# Patient Record
Sex: FEMALE | Race: OTHER | URBAN - METROPOLITAN AREA
[De-identification: names, ages, dates, MRNs, and addresses within clinical notes are randomized per-mention and may not be internally consistent; named-entity substitution may affect disease eponyms.]

---

## 2017-01-13 ENCOUNTER — APPOINTMENT (OUTPATIENT)
Dept: OCCUPATIONAL MEDICINE | Age: 37
End: 2017-01-13
Attending: EMERGENCY MEDICINE

## 2021-12-02 PROBLEM — I10 BENIGN ESSENTIAL HTN: Status: ACTIVE | Noted: 2021-12-02

## 2021-12-02 PROBLEM — G47.33 OSA (OBSTRUCTIVE SLEEP APNEA): Status: ACTIVE | Noted: 2021-12-02

## 2022-02-22 ENCOUNTER — ORDER TRANSCRIPTION (OUTPATIENT)
Dept: SLEEP CENTER | Age: 42
End: 2022-02-22

## 2022-02-22 ENCOUNTER — OFFICE VISIT (OUTPATIENT)
Dept: SLEEP CENTER | Age: 42
End: 2022-02-22
Attending: INTERNAL MEDICINE
Payer: MEDICAID

## 2022-02-22 DIAGNOSIS — R06.83 SNORING: ICD-10-CM

## 2022-02-22 PROCEDURE — 95806 SLEEP STUDY UNATT&RESP EFFT: CPT

## 2022-02-28 ENCOUNTER — OFFICE VISIT (OUTPATIENT)
Dept: SLEEP CENTER | Age: 42
End: 2022-02-28
Attending: INTERNAL MEDICINE
Payer: MEDICAID

## 2022-02-28 PROCEDURE — 95806 SLEEP STUDY UNATT&RESP EFFT: CPT

## 2022-03-01 NOTE — PROCEDURES
[address] [address],[facility]       Accredited by the [employer] (AASM)    PATIENT'S NAME:        Briana Siegel PHYSICIAN:   Radha Wilkerson M.D. REFERRING PHYSICIAN:   Blayne Denson MD  PATIENT ACCOUNT #:     [de-identified]        LOCATION:       [address] [place] [address] #:      [id number]        YOB: 1980  DATE OF STUDY:         02/28/2022    SLEEP STUDY REPORT    STUDY TYPE:  Unattended sleep study. CLINICAL HISTORY:  The patient is a 42-year-old with a history of snoring and daytime tiredness. UNATTENDED SLEEP STUDY RECORDING PARAMETERS:  The patient underwent a formal technically adequate unattended diagnostic sleep study coordinated with the [facility] [address] [facility]. The study was performed in accordance with the AASM standard for Out of Center Sleep Testing. The four-channel Type III HST measures the following parameters:  flow, respiratory effort, pulse, and oxygen saturation. SCORING:  This study was scored in accordance with AASM scoring rules and Medicare rule 1B. POLYSOMNOGRAPHIC FINDINGS:  The total recording time is 5 hours 59 minutes. The total flow time 5 hours 47 minutes. Total oxygen saturation evaluation time 5 hours. Overall, the sleep study quality appeared to be good. RESPIRATORY MEASURES:  The apnea-hypopnea index is 2.8. The oxygen maria teresa is 40% (this appeared to be artifactual). The patient spent 3% of the record with saturations below 88%. IMPRESSION:  There is no evidence for significant sleep-disordered breathing. RECOMMENDATIONS:    1. Clinical correlation is recommended. The clinical correlation will be deferred to the ordering physician, Dr. Gabino Gordon. If there is still concern for sleep-disordered breathing, an in-laboratory study would be recommended. 2.   The patient should avoid driving or operating heavy machinery while sleepy.     Dictated By Janak Healy M.D.  d: 03/01/2022 14:22:42  t: 03/01/2022 14:43:25  [facility] [id number]  YL/    cc: Tierney Bourne MD

## 2022-07-30 NOTE — ED QUICK NOTES
Pt is pacing the floor, partner in the room with pt. Pt is anxious. Able to redirect with multiple commands.

## 2022-07-30 NOTE — ED NOTES
This writer completed [place] Screening with Pt to assess safety risk. Pt denied SI/HI, no plan or intent. Pt states that her main issue is that she \"drinks too much\". Writer also spoke with Pt's partner, Jamal Lugo, who expressed concern for Pt's passive SI and excessive drinking. Jamal Lugo denied that Pt has verbalized or attempted any sort of suicide plan. Writer staffed the case with Dr. Gavino German, who gave the LOC recommendation for Pt to receive referrals for outpatient and residential programs for alcohol use. Writer discussed Tx options with Pt, and Pt reported that she would follow up with referrals for Tx since she is OON with [facility] and does not meet IP criteria and does not want detox.

## 2022-07-30 NOTE — ED QUICK NOTES
Pt c/o neck cramp/ inability to move her neck. MD aware and at bedside for assessment. Orders received for benadryl in case of side effects to medication, see MAR.

## 2022-07-30 NOTE — ED NOTES
As writer discussed disposition with pt, pt became increasingly irritable- as evidenced by, increased volume in voice, verbal aggression, swearing, name calling and threats. Pt continued to be verbally aggressive with EMTs, RNs, and [employer].

## 2022-07-30 NOTE — ED QUICK NOTES
Pt continues to pace around room and cry to staff. Pts family member out to nurses station stating pt spit out the ativan given prior. Spoke with Dr. Silvia Dejesus who stated to give pt haldol IV. Pt placed on monitor for close assessment.

## 2022-07-30 NOTE — ED QUICK NOTES
Security at bedside, pt in hallway refusing to go back to room. Pt medicated, see MAR. Pt getting more verbally aggressive with staff.

## 2022-07-30 NOTE — ED QUICK NOTES
Pt is becoming combative with staff, she is not redirectable, security was called, pt is pacing the floor, and stating that she is getting her belongings and going home.

## 2022-07-30 NOTE — ED INITIAL ASSESSMENT (HPI)
Pt arrives via EMS from [facility]. Per [facility] paperwork pt was taken for alcohol use disorder. Pt currently denies drug/alcohol use. Pt arrives with Petition from [facility] and needs med clearance. Pt states \"im just a little emotional today, I dont want to be here, I want to be at home sleeping, my girlfriend brought me here and shes taking all my money and shes going to go [place]. \" Pt denies SI/HI and any drug/ alcohol use. \" Pt verbally aggressive on arrival.

## 2023-12-12 ENCOUNTER — OFFICE VISIT (OUTPATIENT)
Dept: FAMILY MEDICINE CLINIC | Facility: CLINIC | Age: 43
End: 2023-12-12
Payer: MEDICAID

## 2023-12-12 VITALS
DIASTOLIC BLOOD PRESSURE: 82 MMHG | HEIGHT: 69 IN | HEART RATE: 94 BPM | BODY MASS INDEX: 33.03 KG/M2 | SYSTOLIC BLOOD PRESSURE: 122 MMHG | TEMPERATURE: 98 F | WEIGHT: 223 LBS | RESPIRATION RATE: 18 BRPM

## 2023-12-12 DIAGNOSIS — R06.81 APNEA: ICD-10-CM

## 2023-12-12 DIAGNOSIS — Z12.31 ENCOUNTER FOR SCREENING MAMMOGRAM FOR MALIGNANT NEOPLASM OF BREAST: ICD-10-CM

## 2023-12-12 DIAGNOSIS — Z12.4 SCREENING FOR MALIGNANT NEOPLASM OF CERVIX: ICD-10-CM

## 2023-12-12 DIAGNOSIS — F41.9 ANXIETY: ICD-10-CM

## 2023-12-12 DIAGNOSIS — I10 BENIGN ESSENTIAL HTN: Primary | ICD-10-CM

## 2023-12-12 DIAGNOSIS — Z00.00 LABORATORY EXAMINATION ORDERED AS PART OF A ROUTINE GENERAL MEDICAL EXAMINATION: ICD-10-CM

## 2023-12-12 DIAGNOSIS — R06.83 SNORES: ICD-10-CM

## 2023-12-12 PROCEDURE — 99205 OFFICE O/P NEW HI 60 MIN: CPT | Performed by: FAMILY MEDICINE

## 2023-12-12 PROCEDURE — 3074F SYST BP LT 130 MM HG: CPT | Performed by: FAMILY MEDICINE

## 2023-12-12 PROCEDURE — 3008F BODY MASS INDEX DOCD: CPT | Performed by: FAMILY MEDICINE

## 2023-12-12 PROCEDURE — 3079F DIAST BP 80-89 MM HG: CPT | Performed by: FAMILY MEDICINE

## 2023-12-12 RX ORDER — LISINOPRIL AND HYDROCHLOROTHIAZIDE 25; 20 MG/1; MG/1
1 TABLET ORAL DAILY
Qty: 90 TABLET | Refills: 0 | Status: SHIPPED | OUTPATIENT
Start: 2023-12-12

## 2023-12-12 RX ORDER — PHENTERMINE HYDROCHLORIDE 37.5 MG/1
37.5 TABLET ORAL
Qty: 90 TABLET | Refills: 0 | Status: CANCELLED | OUTPATIENT
Start: 2023-12-12

## 2024-02-01 ENCOUNTER — OFFICE VISIT (OUTPATIENT)
Dept: OTHER | Facility: HOSPITAL | Age: 44
End: 2024-02-01
Attending: PREVENTIVE MEDICINE

## 2024-02-01 DIAGNOSIS — Z02.1 PRE-EMPLOYMENT HEALTH SCREENING EXAMINATION: Primary | ICD-10-CM

## 2024-02-01 PROCEDURE — 86480 TB TEST CELL IMMUN MEASURE: CPT

## 2024-02-05 LAB
M TB IFN-G CD4+ T-CELLS BLD-ACNC: 0.03 IU/ML
M TB TUBERC IFN-G BLD QL: NEGATIVE
M TB TUBERC IGNF/MITOGEN IGNF CONTROL: >10 IU/ML
QFT TB1 AG MINUS NIL: 0 IU/ML
QFT TB2 AG MINUS NIL: 0 IU/ML

## 2024-03-01 ENCOUNTER — LAB ENCOUNTER (OUTPATIENT)
Dept: LAB | Age: 44
End: 2024-03-01
Attending: FAMILY MEDICINE
Payer: MEDICAID

## 2024-03-01 DIAGNOSIS — Z00.00 LABORATORY EXAMINATION ORDERED AS PART OF A ROUTINE GENERAL MEDICAL EXAMINATION: ICD-10-CM

## 2024-03-01 LAB
ALBUMIN SERPL-MCNC: 3.7 G/DL (ref 3.4–5)
ALBUMIN/GLOB SERPL: 0.8 {RATIO} (ref 1–2)
ALP LIVER SERPL-CCNC: 83 U/L
ALT SERPL-CCNC: 32 U/L
ANION GAP SERPL CALC-SCNC: 6 MMOL/L (ref 0–18)
AST SERPL-CCNC: 33 U/L (ref 15–37)
BASOPHILS # BLD AUTO: 0.06 X10(3) UL (ref 0–0.2)
BASOPHILS NFR BLD AUTO: 0.5 %
BILIRUB SERPL-MCNC: 0.6 MG/DL (ref 0.1–2)
BUN BLD-MCNC: 13 MG/DL (ref 9–23)
CALCIUM BLD-MCNC: 9.2 MG/DL (ref 8.5–10.1)
CHLORIDE SERPL-SCNC: 100 MMOL/L (ref 98–112)
CHOLEST SERPL-MCNC: 250 MG/DL (ref ?–200)
CO2 SERPL-SCNC: 26 MMOL/L (ref 21–32)
CREAT BLD-MCNC: 0.81 MG/DL
EGFRCR SERPLBLD CKD-EPI 2021: 93 ML/MIN/1.73M2 (ref 60–?)
EOSINOPHIL # BLD AUTO: 0.35 X10(3) UL (ref 0–0.7)
EOSINOPHIL NFR BLD AUTO: 3 %
ERYTHROCYTE [DISTWIDTH] IN BLOOD BY AUTOMATED COUNT: 15.3 %
EST. AVERAGE GLUCOSE BLD GHB EST-MCNC: 120 MG/DL (ref 68–126)
FASTING PATIENT LIPID ANSWER: YES
FASTING STATUS PATIENT QL REPORTED: YES
GLOBULIN PLAS-MCNC: 4.5 G/DL (ref 2.8–4.4)
GLUCOSE BLD-MCNC: 106 MG/DL (ref 70–99)
HBA1C MFR BLD: 5.8 % (ref ?–5.7)
HCT VFR BLD AUTO: 41.5 %
HDLC SERPL-MCNC: 95 MG/DL (ref 40–59)
HGB BLD-MCNC: 13.5 G/DL
IMM GRANULOCYTES # BLD AUTO: 0.04 X10(3) UL (ref 0–1)
IMM GRANULOCYTES NFR BLD: 0.3 %
LDLC SERPL CALC-MCNC: 132 MG/DL (ref ?–100)
LYMPHOCYTES # BLD AUTO: 2.36 X10(3) UL (ref 1–4)
LYMPHOCYTES NFR BLD AUTO: 20.5 %
MCH RBC QN AUTO: 28.3 PG (ref 26–34)
MCHC RBC AUTO-ENTMCNC: 32.5 G/DL (ref 31–37)
MCV RBC AUTO: 87 FL
MONOCYTES # BLD AUTO: 0.71 X10(3) UL (ref 0.1–1)
MONOCYTES NFR BLD AUTO: 6.2 %
NEUTROPHILS # BLD AUTO: 7.99 X10 (3) UL (ref 1.5–7.7)
NEUTROPHILS # BLD AUTO: 7.99 X10(3) UL (ref 1.5–7.7)
NEUTROPHILS NFR BLD AUTO: 69.5 %
NONHDLC SERPL-MCNC: 155 MG/DL (ref ?–130)
OSMOLALITY SERPL CALC.SUM OF ELEC: 275 MOSM/KG (ref 275–295)
PLATELET # BLD AUTO: 496 10(3)UL (ref 150–450)
POTASSIUM SERPL-SCNC: 3.5 MMOL/L (ref 3.5–5.1)
PROT SERPL-MCNC: 8.2 G/DL (ref 6.4–8.2)
RBC # BLD AUTO: 4.77 X10(6)UL
SODIUM SERPL-SCNC: 132 MMOL/L (ref 136–145)
TRIGL SERPL-MCNC: 136 MG/DL (ref 30–149)
TSI SER-ACNC: 2.68 MIU/ML (ref 0.36–3.74)
VLDLC SERPL CALC-MCNC: 25 MG/DL (ref 0–30)
WBC # BLD AUTO: 11.5 X10(3) UL (ref 4–11)

## 2024-03-01 PROCEDURE — 84443 ASSAY THYROID STIM HORMONE: CPT

## 2024-03-01 PROCEDURE — 80061 LIPID PANEL: CPT

## 2024-03-01 PROCEDURE — 80053 COMPREHEN METABOLIC PANEL: CPT

## 2024-03-01 PROCEDURE — 83036 HEMOGLOBIN GLYCOSYLATED A1C: CPT

## 2024-03-01 PROCEDURE — 85025 COMPLETE CBC W/AUTO DIFF WBC: CPT

## 2024-03-10 DIAGNOSIS — I10 BENIGN ESSENTIAL HTN: ICD-10-CM

## 2024-03-11 RX ORDER — LISINOPRIL AND HYDROCHLOROTHIAZIDE 25; 20 MG/1; MG/1
1 TABLET ORAL DAILY
Qty: 90 TABLET | Refills: 0 | Status: SHIPPED | OUTPATIENT
Start: 2024-03-11

## 2024-03-15 ENCOUNTER — OFFICE VISIT (OUTPATIENT)
Dept: FAMILY MEDICINE CLINIC | Facility: CLINIC | Age: 44
End: 2024-03-15
Payer: MEDICAID

## 2024-03-15 VITALS
SYSTOLIC BLOOD PRESSURE: 126 MMHG | TEMPERATURE: 98 F | HEIGHT: 69 IN | RESPIRATION RATE: 18 BRPM | DIASTOLIC BLOOD PRESSURE: 78 MMHG | WEIGHT: 215 LBS | BODY MASS INDEX: 31.84 KG/M2 | HEART RATE: 79 BPM

## 2024-03-15 DIAGNOSIS — R39.15 URINARY URGENCY: Primary | ICD-10-CM

## 2024-03-15 DIAGNOSIS — F31.9 BIPOLAR 1 DISORDER (HCC): ICD-10-CM

## 2024-03-15 DIAGNOSIS — R73.09 ELEVATED GLUCOSE: ICD-10-CM

## 2024-03-15 DIAGNOSIS — L29.9 PRURITUS: ICD-10-CM

## 2024-03-15 DIAGNOSIS — E78.00 HYPERCHOLESTEROLEMIA: ICD-10-CM

## 2024-03-15 LAB
APPEARANCE: CLEAR
BILIRUBIN: NEGATIVE
GLUCOSE (URINE DIPSTICK): NEGATIVE MG/DL
KETONES (URINE DIPSTICK): NEGATIVE MG/DL
MULTISTIX LOT#: ABNORMAL NUMERIC
NITRITE, URINE: NEGATIVE
OCCULT BLOOD: NEGATIVE
PH, URINE: 7 (ref 4.5–8)
PROTEIN (URINE DIPSTICK): NEGATIVE MG/DL
SPECIFIC GRAVITY: 1.02 (ref 1–1.03)
URINE-COLOR: YELLOW
UROBILINOGEN,SEMI-QN: 0.2 MG/DL (ref 0–1.9)

## 2024-03-15 PROCEDURE — 99214 OFFICE O/P EST MOD 30 MIN: CPT | Performed by: FAMILY MEDICINE

## 2024-03-15 PROCEDURE — 81003 URINALYSIS AUTO W/O SCOPE: CPT | Performed by: FAMILY MEDICINE

## 2024-03-15 RX ORDER — LOVASTATIN 40 MG/1
40 TABLET ORAL NIGHTLY
Qty: 90 TABLET | Refills: 0 | Status: SHIPPED | OUTPATIENT
Start: 2024-03-15 | End: 2024-06-13

## 2024-03-15 NOTE — PROGRESS NOTES
[facility] Office Note  Chief Complaint:   Chief Complaint   Patient presents with    Lab Results     Labs completed on 03/01/2024.  Pt sts she is not feeling like herself lately.     Urge Incontinence    Itching       HPI:   This is a 43 year old female coming in for lab review.  The patient states that recently she has had some increased itching of her hands.  Has not had any other itching anywhere else.  She also has had some increased urinary frequency.  She states that she would like to be referred to psychiatry she has been seeing a psychiatrist but his office is too far at this point.  She is currently on Abilify as well as Zoloft.  She states that she feels that she would like to be on something else to help she has had some sexual side effects with these medications as well as weight gain.      Past Medical History:   Diagnosis Date    Arthritis     Essential hypertension     Tobacco abuse      Past Surgical History:   Procedure Laterality Date    LAPAROSCOPIC CHOLECYSTECTOMY       Social History:  Social History     Socioeconomic History    Marital status:    Tobacco Use    Smoking status: Every Day    Smokeless tobacco: Never   Vaping Use    Vaping Use: Never used   Substance and Sexual Activity    Alcohol use: Yes    Drug use: Never     Family History:  Family History   Problem Relation Age of Onset    Heart Disorder Father     Hypertension Father     No Known Problems Mother      Allergies:  No Known Allergies  Current Meds:  Current Outpatient Medications   Medication Sig Dispense Refill    Lovastatin 40 MG Oral Tab Take 1 tablet (40 mg total) by mouth nightly. 90 tablet 0    metFORMIN HCl 1000 MG Oral Tab Take 1 tablet (1,000 mg total) by mouth 2 (two) times daily with meals. 180 tablet 0    lisinopril-hydroCHLOROthiazide 20-25 MG Oral Tab Take 1 tablet by mouth daily. 90 tablet 0    sertraline 50 MG Oral Tab Take 1 tablet (50 mg total) by mouth daily. 90 tablet 0     traZODone 100 MG Oral Tab Take 1 tablet (100 mg total) by mouth daily.      Phentermine HCl 37.5 MG Oral Tab Take 1 tablet (37.5 mg total) by mouth every morning before breakfast. (Patient not taking: Reported on 12/12/2023) 90 tablet 0      Ready to quit: Not Answered  Counseling given: Not Answered       REVIEW OF SYSTEMS:   Consitutional: No fevers, chills, sweats  Eye: No recent visual problems  ENMT: No ear pain nasal congestion sore throat  Respiratory: No shortness of breath, cough  Cardiovascular: No chest pain palpitations syncope  Gastrointestinal: No nausea vomiting diarrhea  Genitourinary: No hematuria positive urinary frequency  Hema/Lymph no bruising tendency, swollen lymph glands  Endocrine: Negative for excessive thirst excessive hunger  Musculoskeletal: No back pain neck pain joint pain muscle pain decreased range of motion  Integumentary: No rash, positive pruritus, no abrasions  Neurologic: Alert and oriented x4  Psychiatric: No anxiety, depression    Medical, surgical, family, and social histories were reviewed      EXAM:   VITALS:   Vitals:    03/15/24 1102   BP: 126/78   Pulse: 79   Resp: 18   Temp: 97.9 °F (36.6 °C)      GENERAL: well developed, well nourished, in no apparent distress  SKIN: no rashes, no suspicious lesions: Cool and Dry  HEENT: atraumatic, normocephalic, ears and throat are clear    Ears: TM's clear and visible bilaterally, no excess cerumen or erythema.   EYES: Pupils equal round and reactive.  Extraocular motions intact no scleral icterus no injection or drainage  THROAT without erythema tonsillar hypertrophy or exudate.  Uvula midline airway patent  NECK: Given midline.  No JVD or lymphadenopathy supple nontender no meningeal signs   LUNGS: clear to auscultation sounds equal bilaterally no wheezes rales or rhonchi  CARDIO: Regular rate and rhythm without murmurs gallops or rubs  PSYCHIATRIC: Awake, alert and oriented x3, cooperative appropriate mood and affect.  Judgment  intact       ASSESSMENT AND PLAN:   1. Elevated glucose  - metFORMIN HCl 1000 MG Oral Tab; Take 1 tablet (1,000 mg total) by mouth 2 (two) times daily with meals.  Dispense: 180 tablet; Refill: 0  - Hemoglobin A1C; Future  I did discuss with the patient that her hemoglobin A1c is currently at a 5.8 I did discuss her like for her to focus on her diet decreasing fatty food fried food intake.  She was asked to try to focus on her protein as well.  Will be starting her on metformin at 1000 mg twice a day.  She was asked to follow-up in the next 3 months to repeat a hemoglobin A1c.  2. Hypercholesterolemia  - Lovastatin 40 MG Oral Tab; Take 1 tablet (40 mg total) by mouth nightly.  Dispense: 90 tablet; Refill: 0  - Comp Metabolic Panel (14); Future  - Lipid Panel; Future  I did discuss with the patient that her LDL did go up from her previous blood work at this time would suggest starting her on lovastatin she was asked to use this once a day she was asked to watch her diet we will be updating blood work in the next 3 months  3. Bipolar 1 disorder (HCC)  - Psychology Referral - In Network  I did discuss with the patient we will go ahead and place a referral to psychiatry as she wants to have her medications adjusted.  4. Pruritus  I did discuss with the patient that this may be the gloves that she has been using she is a CNA she was asked to try the nitrile gloves instead     Meds & Refills for this Visit:  Requested Prescriptions     Signed Prescriptions Disp Refills    Lovastatin 40 MG Oral Tab 90 tablet 0     Sig: Take 1 tablet (40 mg total) by mouth nightly.    metFORMIN HCl 1000 MG Oral Tab 180 tablet 0     Sig: Take 1 tablet (1,000 mg total) by mouth 2 (two) times daily with meals.       Health Maintenance:  Health Maintenance Due   Topic Date Due    Annual Physical  Never done    Mammogram  Never done    Tobacco Cessation Counseling 1 Year  Never done    Pap Smear  Never done    COVID-19 Vaccine (3 - 2023-24  season) 09/01/2023    Influenza Vaccine (1) Never done       Patient/Caregiver Education: Patient/Caregiver Education: There are no barriers to learning. Medical education done.   Outcome: Patient verbalizes understanding. Patient is notified to call with any questions, complications, allergies, or worsening or changing symptoms.  Patient is to call with any side effects or complications from the treatments as a result of today.     Problem List:  Patient Active Problem List   Diagnosis    BMI 30.0-30.9,adult    Benign essential HTN    GARCIA (obstructive sleep apnea)         No follow-ups on file.     Rakesh Ho MD    Please note that portions of this note may have been completed with a voice recognition program. Efforts were made to edit the dictations but occasionally words are mis-transcribed.

## 2024-03-22 ENCOUNTER — TELEPHONE (OUTPATIENT)
Age: 44
End: 2024-03-22

## 2024-03-29 ENCOUNTER — TELEPHONE (OUTPATIENT)
Age: 44
End: 2024-03-29

## 2024-03-29 NOTE — TELEPHONE ENCOUNTER
Psychiatry    [facility]  [address]   [address]  [place] [address]  Phone: [phone]    [facility]  [address]  [address]  [place] [address]  Phone: [phone]    [facility]  [address] [address]  Phone: [phone]    Therapy    Kathy Stewart, LCSW  [facility]  [address]   [address]  [place] [address]  Phone: [phone]    [facility]  [address]  [address]  [place] [address]  Phone: [phone]    [facility]  [address]  [address]  [place] [address]  Phone: [phone]

## 2024-04-16 ENCOUNTER — OFFICE VISIT (OUTPATIENT)
Dept: SLEEP CENTER | Age: 44
End: 2024-04-16
Attending: INTERNAL MEDICINE
Payer: MEDICAID

## 2024-04-16 DIAGNOSIS — R06.81 APNEA: Primary | ICD-10-CM

## 2024-04-16 DIAGNOSIS — G47.10 HYPERSOMNOLENCE: ICD-10-CM

## 2024-04-16 DIAGNOSIS — R06.83 SNORING: ICD-10-CM

## 2024-04-16 DIAGNOSIS — R06.81 WITNESSED EPISODE OF APNEA: ICD-10-CM

## 2024-04-16 PROCEDURE — 95811 POLYSOM 6/>YRS CPAP 4/> PARM: CPT

## 2024-04-25 ENCOUNTER — SLEEP STUDY (OUTPATIENT)
Facility: CLINIC | Age: 44
End: 2024-04-25
Payer: MEDICAID

## 2024-04-25 DIAGNOSIS — G47.33 OBSTRUCTIVE SLEEP APNEA SYNDROME: Primary | ICD-10-CM

## 2024-04-25 PROCEDURE — 95811 POLYSOM 6/>YRS CPAP 4/> PARM: CPT | Performed by: OTHER

## 2024-04-29 ENCOUNTER — HOSPITAL ENCOUNTER (OUTPATIENT)
Dept: MAMMOGRAPHY | Age: 44
Discharge: HOME | End: 2024-04-29
Attending: FAMILY MEDICINE
Payer: MEDICAID

## 2024-04-29 DIAGNOSIS — Z12.31 ENCOUNTER FOR SCREENING MAMMOGRAM FOR MALIGNANT NEOPLASM OF BREAST: ICD-10-CM

## 2024-04-29 PROCEDURE — 77067 SCR MAMMO BI INCL CAD: CPT | Performed by: FAMILY MEDICINE

## 2024-04-29 PROCEDURE — 77063 BREAST TOMOSYNTHESIS BI: CPT | Performed by: FAMILY MEDICINE

## 2024-04-30 ENCOUNTER — OFFICE VISIT (OUTPATIENT)
Facility: CLINIC | Age: 44
End: 2024-04-30
Payer: MEDICAID

## 2024-04-30 VITALS
BODY MASS INDEX: 32.29 KG/M2 | HEIGHT: 69 IN | OXYGEN SATURATION: 98 % | HEART RATE: 82 BPM | TEMPERATURE: 98 F | DIASTOLIC BLOOD PRESSURE: 78 MMHG | WEIGHT: 218 LBS | RESPIRATION RATE: 16 BRPM | SYSTOLIC BLOOD PRESSURE: 130 MMHG

## 2024-04-30 DIAGNOSIS — E66.01 SEVERE OBESITY (BMI >= 40) (HCC): Primary | ICD-10-CM

## 2024-04-30 PROCEDURE — 99204 OFFICE O/P NEW MOD 45 MIN: CPT | Performed by: INTERNAL MEDICINE

## 2024-04-30 NOTE — PROGRESS NOTES
This is a 43 year old female who presents with the following symptoms, risk factors, behaviors or other items associated with sleep problems.    Sleep Apnea:   snoring; gasping/choking; family member with sleep apnea  Insomnia:  difficulty falling asleep  Restless Leg:  urge to move legs when trying to sleep  Parasomnias:   very restless sleep; teeth grinding  Daytime Problems:  fatigue; inattentiveness    The patient's [place] Sleepiness score is 0/24.

## 2024-04-30 NOTE — PATIENT INSTRUCTIONS
Plan:    Initiate  APAP at 8-14cm H2O, with humidity at 3 and EPR on. with a [employer] Vitera mask, size Medium.   Then Obtain Download data for compliance and efficacy from CPAP machine in 30 days   Advised about weight loss   Referral to weight loss clinic   Advised against drowsy driving and to avoid alcoholic beverage and respiratory depressants as these may worsen sleep apnea      Follow up: 3  months     Dwayne Bansal MD      Obstructive Sleep Apnea  Obstructive sleep apnea is a condition caused by air passages becoming narrowed or blocked during sleep. As a result, breathing stops for short periods. Your body wakes up enough for breathing to start again. But you don't remember it. The cycle of stopped breathing and brief awakenings can repeat dozens of times a night. This prevents the body from getting to the deeper stages of sleep that are needed for good rest.   Signs of sleep apnea include loud snoring, noisy breathing, and gasping sounds during sleep. People with sleep apnea often find they use the bathroom many times during the night. Daytime symptoms include waking up tired after a full night's sleep and waking up with headaches. They can also include feeling very sleepy or falling asleep during the day, and having problems with memory or concentration.   Risk factors for sleep apnea include:  Being overweight  Being assigned male at birth, or being in menopause  Smoking  Using alcohol or sedating medicines  Having enlarged structures in the nose or throat such as enlarged tonsils or adenoids, or extra tissue in the airway  Home care  Lifestyle changes that can help treat snoring and sleep apnea include:   If you're overweight, talk with your healthcare provider about a weight-loss plan for you.  Don't drink alcohol for 3 to 4 hours before bedtime.  Don't take sedating medicines. Ask your healthcare provider about the medicines you take.  If you smoke, talk to your provider about ways to quit.  It's important to stay away from secondhand smoke. Don't use e-cigarettes because of their harmful side effects.  Sleep on your side. This can help prevent gravity from pulling relaxed throat tissues into your breathing passages.  If you have allergies or sinus problems that block your nose, ask your provider for help.  Use positive airway pressure (PAP). Discuss with your provider the benefits of using PAP at home. And talk about the type of PAP that's best for you.  Follow-up care  Follow up with your healthcare provider, or as advised. A diagnosis of sleep apnea is made with a sleep study. Your provider can tell you more about this test.   When to get medical care  See your healthcare provider if you have daytime symptoms of sleep apnea. These include:   Waking up tired after a full night's sleep  Waking up with a headache  Feeling very sleepy or falling asleep during the day  Having problems with memory or concentration  Also talk with your provider if your partner tells you that you snore, gasp for air, or stop breathing while you sleep.   Seeing your provider is important because sleep apnea can make you more likely to have certain health problems. These include high blood pressure, heart attack, stroke, and sexual dysfunction. If you have sleep apnea, talk with your healthcare provider about the best treatments for you.   [employer] last reviewed this educational content on 5/1/2022 © 2000-2023 [employer]. All rights reserved. This information is not intended as a substitute for professional medical care. Always follow your healthcare professional's instructions.        Continuous Positive Air Pressure (CPAP)     A mask over the nose gently directs air into the throat to keep the airway open.     Continuous positive air pressure (CPAP) uses gentle air pressure to hold the airway open. CPAP is often the most effective treatment for sleep apnea. It works very well as a treatment for adults  diagnosed with obstructive sleep apnea with a lot of sleepiness. But keep in mind that it can take several adjustments before the setup is right for you.   How CPAP works  The CPAP machine  is a small portable pump that sits beside the bed. The pump sends air through a hose, which is held over your nose alone, or nose and mouth by a mask. Mild air pressure is gently pushed through your airway. The air pressure nudges sagging tissues aside. This widens the airway so you can breathe better. CPAP may be combined with other kinds of therapy for sleep apnea.   Types of air pressure treatments  There are different types of CPAP. Your doctor or CPAP technician will help you decide which type is best for you:   Basic CPAP keeps the pressure constant all night long.  A bilevel device (BiPAP) provides more pressure when you breathe in and less when you breathe out. A BiPAP machine also may be set to provide automatic breaths to maintain breathing if you stop breathing while sleeping.  An autoCPAP device automatically adjusts pressure throughout the night and in response to changes such as body position, sleep stage, and snoring.  [employer] last reviewed this educational content on 7/1/2019  © 0346-2854 [employer]. All rights reserved. This information is not intended as a substitute for professional medical care. Always follow your healthcare professional's instructions.

## 2024-04-30 NOTE — PROGRESS NOTES
Pulmonary/Critical Care/Sleep Medicine    Consult Note     PCP: Rakesh Ho MD   Phone: [phone]   Fax: [phone]          Chief Complaint   Patient presents with    Apnea     Snoring, witnessed apneas.  DX sleep study done.         HPI  I had the pleasure of seeing Jaylin Sanford who is a pleasant 43 year old female who presents for evaluation of GARCIA         The patient states that she has snored at home in past and was noted to have witnessed apnea, she underwent a sleep study that showed GARCIA, so she presents for sleep evaluation.      She states bed time around 10 PM . It takes 30 min to 1 hour to fall asleep and leaves bed around 6  AM. She wakes up sometimes 4  times a night,goes to bathroom.  She is sleepy and fatigued during the daytime.  She admits to tossing and turning at night.  She admits to nightmares,denies  sleep talking or sleep walking.  She admits to occasional  AM headaches.  She denies symptoms sleep attacks     The patient admits to kicking legs at night.  Admits to teeth grinding, and has used dental guard in past .          She drinks 2 cups of caffeine coffee daily,        She has no pets             Hx of tobacco use: She  reports that she has been smoking cigarettes. She started smoking about 10 years ago. She has never used smokeless tobacco.    Past Medical History:    Arthritis    Bipolar I disorder (HCC)    Essential hypertension    HTN (hypertension)    Tobacco abuse    Type II diabetes mellitus (HCC)      Past Surgical History:   Procedure Laterality Date    Laparoscopic cholecystectomy       No Known Allergies  Current Outpatient Medications   Medication Sig Dispense Refill    Lovastatin 40 MG Oral Tab Take 1 tablet (40 mg total) by mouth nightly. 90 tablet 0    metFORMIN HCl 1000 MG Oral Tab Take 1 tablet (1,000 mg total) by mouth 2 (two) times daily with meals. 180 tablet 0    lisinopril-hydroCHLOROthiazide 20-25 MG Oral Tab Take 1 tablet by mouth daily. 90 tablet  0    sertraline 50 MG Oral Tab Take 1 tablet (50 mg total) by mouth daily. 90 tablet 0    traZODone 100 MG Oral Tab Take 1 tablet (100 mg total) by mouth daily.      Phentermine HCl 37.5 MG Oral Tab Take 1 tablet (37.5 mg total) by mouth every morning before breakfast. (Patient not taking: Reported on 12/12/2023) 90 tablet 0      Social History     Socioeconomic History    Marital status:    Occupational History    Occupation: CNA     Comment: and nursing student   Tobacco Use    Smoking status: Every Day     Types: Cigarettes     Start date: 2014    Smokeless tobacco: Never    Tobacco comments:     Smokes few cigarettes per day    Vaping Use    Vaping status: Never Used   Substance and Sexual Activity    Alcohol use: Not Currently    Drug use: Not Currently      Immunization History   Administered Date(s) Administered    Covid-19 Vaccine Moderna 100 mcg/0.5 ml 09/03/2021, 10/09/2021    TDAP 04/28/2019      Family History   Problem Relation Age of Onset    Depression Mother     Heart Disorder Father     Hypertension Father     Breast Cancer Maternal Aunt 60        Review of Systems   Constitutional:  Positive for fatigue. Negative for fever and unexpected weight change.   HENT:  Negative for congestion, mouth sores, nosebleeds, postnasal drip, rhinorrhea, sore throat and trouble swallowing.    Eyes:  Negative for visual disturbance.   Respiratory:  Negative for apnea, cough, choking, chest tightness, shortness of breath and wheezing.    Cardiovascular:  Negative for chest pain, palpitations and leg swelling.   Gastrointestinal:  Negative for abdominal pain, constipation, diarrhea, nausea and vomiting.   Genitourinary:  Negative for difficulty urinating.   Musculoskeletal:  Negative for arthralgias, back pain, gait problem and myalgias.   Neurological:  Positive for headaches. Negative for dizziness and weakness.   Psychiatric/Behavioral:  Positive for sleep disturbance.         Vitals:    04/30/24 1332   BP:  130/78   Pulse: 82   Resp: 16   Temp: 98 °F (36.7 °C)      SpO2: 98 %  Ht Readings from Last 1 Encounters:   04/30/24 5' 9\" (1.753 m)     Wt Readings from Last 1 Encounters:   04/30/24 218 lb (98.9 kg)     Body mass index is 32.19 kg/m².     Physical Exam  Constitutional:       General: She is not in acute distress.     Appearance: Normal appearance. She is not ill-appearing or diaphoretic.   HENT:      Head: Normocephalic and atraumatic.      Nose: Nose normal. No congestion or rhinorrhea.      Comments: Narrow nares      Mouth/Throat:      Mouth: Mucous membranes are moist.      Pharynx: Oropharynx is clear. No oropharyngeal exudate or posterior oropharyngeal erythema.      Comments: Mallampati class IV palate   Eyes:      Extraocular Movements: Extraocular movements intact.      Pupils: Pupils are equal, round, and reactive to light.   Cardiovascular:      Rate and Rhythm: Normal rate.      Pulses: Normal pulses.      Heart sounds: Normal heart sounds. No murmur heard.  Pulmonary:      Effort: Pulmonary effort is normal. No respiratory distress.      Breath sounds: Normal breath sounds. No wheezing or rhonchi.   Chest:      Chest wall: No tenderness.   Abdominal:      General: Abdomen is flat. Bowel sounds are normal.      Palpations: Abdomen is soft.   Musculoskeletal:         General: Normal range of motion.   Skin:     General: Skin is warm.   Neurological:      General: No focal deficit present.      Mental Status: She is alert and oriented to person, place, and time.   Psychiatric:         Mood and Affect: Mood normal.         Behavior: Behavior normal.         Thought Content: Thought content normal.         Judgment: Judgment normal.             Labs:  Last BMP  Lab Results   Component Value Date     (H) 03/01/2024    BUN 13 03/01/2024    CREATSERUM 0.81 03/01/2024    ANIONGAP 6 03/01/2024    GFRAA 111 07/30/2022    GFRNAA 96 07/30/2022    CA 9.2 03/01/2024     (L) 03/01/2024    K 3.5  03/01/2024     03/01/2024    CO2 26.0 03/01/2024    OSMOCALC 275 03/01/2024      Last CBC  Lab Results   Component Value Date    WBC 11.5 (H) 03/01/2024    RBC 4.77 03/01/2024    HGB 13.5 03/01/2024    HCT 41.5 03/01/2024    MCV 87.0 03/01/2024    MCH 28.3 03/01/2024    MCHC 32.5 03/01/2024    RDW 15.3 03/01/2024    .0 (H) 03/01/2024      Last CMP  Lab Results   Component Value Date     (H) 03/01/2024    BUN 13 03/01/2024    CREATSERUM 0.81 03/01/2024    ANIONGAP 6 03/01/2024    GFRNAA 96 07/30/2022    GFRAA 111 07/30/2022    CA 9.2 03/01/2024    OSMOCALC 275 03/01/2024    ALKPHO 83 03/01/2024    AST 33 03/01/2024    ALT 32 03/01/2024    BILT 0.6 03/01/2024    TP 8.2 03/01/2024    ALB 3.7 03/01/2024    GLOBULIN 4.5 (H) 03/01/2024     (L) 03/01/2024    K 3.5 03/01/2024     03/01/2024    CO2 26.0 03/01/2024      Last Thyroid Function  Lab Results   Component Value Date    TSH 2.680 03/01/2024        Imaging:  [facility] MANNY 2D+3D SCREENING BILAT (CPT=77067/36036)    Result Date: 4/30/2024  CONCLUSION:  No mammographic evidence of malignancy.  BI-RADS CATEGORY:  DIAGNOSTIC CATEGORY 1--NEGATIVE ASSESSMENT.   RECOMMENDATIONS:  ROUTINE MAMMOGRAM AND CLINICAL EVALUATION IN 12 MONTHS.       A letter explaining the results in lay terms has been sent to the patient.  This exam was evaluated with a computer-aided device.  This patient's information has been entered into a reminder system with a target due date for the next mammogram.   LOCATION:  [facility]   Dictated by (CST): Roxy Mandujano MD on 4/30/2024 at 9:21 AM     Finalized by (CST): Roxy Mandujano MD on 4/30/2024 at 9:25 AM           This is a 43 year old female who presents with the following symptoms, risk factors, behaviors or other items associated with sleep problems.     Sleep Apnea:   snoring; gasping/choking; family member with sleep apnea  Insomnia:  difficulty falling asleep  Restless Leg:  urge to move legs when trying to  sleep  Parasomnias:   very restless sleep; teeth grinding  Daytime Problems:  fatigue; inattentiveness               Study 4/16/2024 Type: Split Night   Procedure: The Polysomnogram was performed with a sleep technologist in attendance at the [facility]. The following parameters were monitored: central, occipital and temporal EEG, EOG, submentalis EMG, nasal flow, nasal pressure, respiratory inductance plethysmography and oxygen saturation via continuous pulse oximetry. Titration of positive airway pressure was also performed during the study, with an additional channel for the measurement of CPAP flow. Sleep stages, periodic limb movements and EEG arousals were scored in accordance with the American Academy of Sleep Medicine Manual for the Scoring of Sleep and Associated Events. Apnea Hypopnea Index was calculated using the recommended definition of hypopnea for scoring respiratory events. Data acquisition, collection and scoring have been validated and clinically correlated.   Sleep History: MUKESH LEIVA is a 43 year old Female referred by MARTIN MEDEROS for the evaluation of suspected obstructive sleep apnea.   Past Medical History is pertinent for arthritis, hypertension, snoring.   At the time of the study, the patient was prescribed the following medications: Lisinopril, sertraline, trazadone, phentermne.   Sleep Architecture: During the baseline portion of the study, the total recording time was 136.0, total sleep time was 124.5 and sleep efficiency of 91.5%. The sleep latency was 5.0. Wake after sleep onset was 6.5 minutes. The percentage of total sleep time by sleep stage is as follows: Stage 1 3.2%, Stage 2 72.3%, Stage 3 0.0% and Stage REM 24.5%.   During the CPAP Titration portion of the study, the total recording time was 297.6, total sleep time was 284.0 and sleep efficiency was 95.4%. The sleep latency was 0.0. Wake after sleep onset was 13.5 minutes. Percentage of total sleep time  by sleep stage is as follows: Stage 1 1.1%, Stage 2 73.4%, Stage 3 0.0% and Stage REM 25.5%.   Respiratory Analysis: During the baseline portion of the study, respiratory monitoring revealed an Apnea-Hypopnea Index (AHI) of 67.0, with an overall Respiratory Disturbance Index (RDI) of 67.0 events per hour. The REM AHI was 43.3. The supine AHI was 0 events per hour. The non-supine related AHI was 0 events per hour. The Central Apnea Index was 0. The oxygen maria teresa was 77.8% and the patient Report printed: 24-Apr-24 MUKESH LEIVA Page 2 of 7     spent 4.8% of sleep time with oxygen levels below 88%. Supplemental oxygen was not used during the study.   During the CPAP portion of the study, respiratory monitoring revealed resolution of obstructive events with CPAP at 13 cm H2O. Supine REM was observed at this pressure.   Snoring Profile: During the baseline portion of the study, snoring was moderate. During CPAP titration, snoring was resolved.   Cardiac Profile: During the baseline portion of the study, Electrocardiogram demonstrated normal sinus rhythm. During the CPAP Titration portion of the study, Electrocardiogram demonstrated normal sinus rhythm.   EEG Profile: Based on the limited recording montage, during the baseline portion of the study there were no significant EEG abnormalities noted. There were 0 spontaneous arousals, with a total arousal index of 77.9. During the CPAP Titration portion of the study there were no significant EEG abnormalities noted. There were 5 spontaneous arousals, with a total arousal index of 6.2.   Periodic Limb Movements: During the baseline portion of the study, the PLM index of 26.5. PLM arousal index of 5.3. During the CPAP Titration portion of the study, the PLM index of 0. PLM arousal index of 0.   IMPRESSIONS: This study reveals obstructive sleep apnea and was a successful CPAP titration.   Diagnosis: Obstructive Sleep Apnea G47.33   RECOMMENDATIONS:   1. It is recommended that  the patient should be prescribed APAP at 8-14cm H2O, with humidity at 3 and EPR on.   2. During the titration, the patient was fitted with a [employer] Vitera mask, size Medium.   3. The patient should avoid alcohol and sedative medications, as these may worsen severity of symptoms.   4. The patient should avoid drowsy driving.   5. Patient to follow up with a sleep specialist in clinic.     Thank you for allowing me to participate in this patient's care. Please do not hesitate to contact me with any additional questions.   Dictated by: Dr. Gay   Electronically signed by Trena Gay MD   (Electronic Signature)   Board Certified in Sleep Medicine   Ethel Sleepiness Scale: (ESS) score on today's visit is 0 out of 24.     Score total of 1-6    Normal sleep   Score total of 7-8    Average sleepiness   Score total of 9-24    Abnormal (possibly pathologic) sleepiness       Impression:    Obstructive sleep apnea syndrome (OSAS):  Attended sleep study performed on 4/16/2024 showed Apnea-Hypopnea Index (AHI) of 67.0, with an overall Respiratory Disturbance Index (RDI) of 67.0 events per hour. The REM AHI was 43.3.  On the CPAP titration portion of sleep study. There was resolution of AHI at 13 cwp.     Daytime hypersomnolence/fatigue  Obesity: Class I ;  Body mass index is 32.19 kg/m².  Bruxism: uses dental guard   Fatigue   Bipolar disorder   Type II diabetes Mellitus   Hypertension                             Plan:    Initiate  APAP at 8-14cm H2O, with humidity at 3 and EPR on. with a [employer] Vitera mask, size Medium.   Then Obtain Download data for compliance and efficacy from CPAP machine in 30 days   Advised about weight loss   Referral to weight loss clinic   Advised against drowsy driving and to avoid alcoholic beverage and respiratory depressants as these may worsen sleep apnea      Follow up: 3  months     Thank you for allowing me to participate in your patient care.    SARAHY Bansal MD, FACP,  FCCP, [facility] - Pulmonary/Critical care/Sleep Medicine  Please contact our office if you have any questions or concerns at [phone]

## 2024-05-07 ENCOUNTER — TELEPHONE (OUTPATIENT)
Facility: CLINIC | Age: 44
End: 2024-05-07

## 2024-05-08 ENCOUNTER — TELEPHONE (OUTPATIENT)
Facility: CLINIC | Age: 44
End: 2024-05-08

## 2024-06-01 DIAGNOSIS — I10 BENIGN ESSENTIAL HTN: ICD-10-CM

## 2024-06-01 DIAGNOSIS — R73.09 ELEVATED GLUCOSE: ICD-10-CM

## 2024-06-01 DIAGNOSIS — E78.00 HYPERCHOLESTEROLEMIA: ICD-10-CM

## 2024-06-03 RX ORDER — LOVASTATIN 40 MG/1
40 TABLET ORAL NIGHTLY
Qty: 90 TABLET | Refills: 0 | Status: SHIPPED | OUTPATIENT
Start: 2024-06-03 | End: 2024-09-01

## 2024-06-03 RX ORDER — LISINOPRIL AND HYDROCHLOROTHIAZIDE 25; 20 MG/1; MG/1
1 TABLET ORAL DAILY
Qty: 90 TABLET | Refills: 0 | Status: SHIPPED | OUTPATIENT
Start: 2024-06-03

## 2024-06-27 ENCOUNTER — LAB ENCOUNTER (OUTPATIENT)
Dept: LAB | Age: 44
End: 2024-06-27
Attending: FAMILY MEDICINE
Payer: MEDICAID

## 2024-06-27 DIAGNOSIS — R73.09 ELEVATED GLUCOSE: ICD-10-CM

## 2024-06-27 DIAGNOSIS — E78.00 HYPERCHOLESTEROLEMIA: ICD-10-CM

## 2024-06-27 LAB
ALBUMIN SERPL-MCNC: 3.5 G/DL (ref 3.4–5)
ALBUMIN/GLOB SERPL: 0.8 {RATIO} (ref 1–2)
ALP LIVER SERPL-CCNC: 73 U/L
ALT SERPL-CCNC: 37 U/L
ANION GAP SERPL CALC-SCNC: 8 MMOL/L (ref 0–18)
AST SERPL-CCNC: 14 U/L (ref 15–37)
BILIRUB SERPL-MCNC: 0.3 MG/DL (ref 0.1–2)
BUN BLD-MCNC: 16 MG/DL (ref 9–23)
CALCIUM BLD-MCNC: 8.8 MG/DL (ref 8.5–10.1)
CHLORIDE SERPL-SCNC: 103 MMOL/L (ref 98–112)
CHOLEST SERPL-MCNC: 240 MG/DL (ref ?–200)
CO2 SERPL-SCNC: 26 MMOL/L (ref 21–32)
CREAT BLD-MCNC: 0.62 MG/DL
EGFRCR SERPLBLD CKD-EPI 2021: 115 ML/MIN/1.73M2 (ref 60–?)
EST. AVERAGE GLUCOSE BLD GHB EST-MCNC: 131 MG/DL (ref 68–126)
FASTING PATIENT LIPID ANSWER: YES
FASTING STATUS PATIENT QL REPORTED: YES
GLOBULIN PLAS-MCNC: 4.2 G/DL (ref 2.8–4.4)
GLUCOSE BLD-MCNC: 108 MG/DL (ref 70–99)
HBA1C MFR BLD: 6.2 % (ref ?–5.7)
HDLC SERPL-MCNC: 57 MG/DL (ref 40–59)
LDLC SERPL CALC-MCNC: 145 MG/DL (ref ?–100)
NONHDLC SERPL-MCNC: 183 MG/DL (ref ?–130)
OSMOLALITY SERPL CALC.SUM OF ELEC: 286 MOSM/KG (ref 275–295)
POTASSIUM SERPL-SCNC: 3.6 MMOL/L (ref 3.5–5.1)
PROT SERPL-MCNC: 7.7 G/DL (ref 6.4–8.2)
SODIUM SERPL-SCNC: 137 MMOL/L (ref 136–145)
TRIGL SERPL-MCNC: 211 MG/DL (ref 30–149)
VLDLC SERPL CALC-MCNC: 40 MG/DL (ref 0–30)

## 2024-06-27 PROCEDURE — 80061 LIPID PANEL: CPT

## 2024-06-27 PROCEDURE — 80053 COMPREHEN METABOLIC PANEL: CPT

## 2024-06-27 PROCEDURE — 83036 HEMOGLOBIN GLYCOSYLATED A1C: CPT

## 2024-07-02 ENCOUNTER — OFFICE VISIT (OUTPATIENT)
Dept: FAMILY MEDICINE CLINIC | Facility: CLINIC | Age: 44
End: 2024-07-02
Payer: MEDICAID

## 2024-07-02 VITALS
HEART RATE: 79 BPM | SYSTOLIC BLOOD PRESSURE: 112 MMHG | DIASTOLIC BLOOD PRESSURE: 62 MMHG | WEIGHT: 221 LBS | RESPIRATION RATE: 18 BRPM | HEIGHT: 69 IN | BODY MASS INDEX: 32.73 KG/M2 | TEMPERATURE: 98 F

## 2024-07-02 DIAGNOSIS — E78.00 HYPERCHOLESTEROLEMIA: ICD-10-CM

## 2024-07-02 DIAGNOSIS — R73.09 ELEVATED GLUCOSE: Primary | ICD-10-CM

## 2024-07-02 PROCEDURE — 99214 OFFICE O/P EST MOD 30 MIN: CPT | Performed by: FAMILY MEDICINE

## 2024-07-02 RX ORDER — ATORVASTATIN CALCIUM 40 MG/1
40 TABLET, FILM COATED ORAL NIGHTLY
Qty: 90 TABLET | Refills: 0 | Status: SHIPPED | OUTPATIENT
Start: 2024-07-02 | End: 2024-09-30

## 2024-07-02 RX ORDER — ARIPIPRAZOLE 5 MG/1
5 TABLET ORAL DAILY
COMMUNITY
Start: 2024-06-13

## 2024-07-02 RX ORDER — BUSPIRONE HYDROCHLORIDE 10 MG/1
10 TABLET ORAL 3 TIMES DAILY
COMMUNITY
Start: 2024-06-12

## 2024-07-02 RX ORDER — EMPAGLIFLOZIN 25 MG/1
25 TABLET, FILM COATED ORAL DAILY
Qty: 90 TABLET | Refills: 0 | Status: SHIPPED | OUTPATIENT
Start: 2024-07-02 | End: 2024-09-30

## 2024-07-02 NOTE — PROGRESS NOTES
[facility] Family Medicine Office Note  Chief Complaint:   Chief Complaint   Patient presents with    Lab Results     Labs completed on 06/27/2024       HPI:   This is a 43 year old female coming in for lab review.  The patient states that she has been taking her medications as prescribed.  She states that she has followed up with psychiatry they have continued with her Abilify as well as adding on BuSpar.  States that she has continued to gain weight.  Denies any chest pain nausea vomiting diarrhea constipation.  Patient has a past medical history of bipolar hypertension hyperlipidemia elevated blood sugar      Past Medical History:    Arthritis    Bipolar I disorder (HCC)    Essential hypertension    HTN (hypertension)    Tobacco abuse    Type II diabetes mellitus (HCC)     Past Surgical History:   Procedure Laterality Date    Laparoscopic cholecystectomy       Social History:  Social History     Socioeconomic History    Marital status:    Occupational History    Occupation: CNA     Comment: and nursing student   Tobacco Use    Smoking status: Every Day     Types: Cigarettes     Start date: 2014    Smokeless tobacco: Never    Tobacco comments:     Smokes few cigarettes per day    Vaping Use    Vaping status: Never Used   Substance and Sexual Activity    Alcohol use: Not Currently    Drug use: Not Currently     Family History:  Family History   Problem Relation Age of Onset    Depression Mother     Heart Disorder Father     Hypertension Father     Breast Cancer Maternal Aunt 60     Allergies:  Allergies   Allergen Reactions    Metformin DIARRHEA     Current Meds:  Current Outpatient Medications   Medication Sig Dispense Refill    ARIPiprazole 5 MG Oral Tab Take 1 tablet (5 mg total) by mouth daily.      busPIRone 10 MG Oral Tab Take 1 tablet (10 mg total) by mouth 3 (three) times daily.      atorvastatin 40 MG Oral Tab Take 1 tablet (40 mg total) by mouth nightly. 90 tablet 0    Empagliflozin  (JARDIANCE) 25 MG Oral Tab Take 25 mg by mouth daily. 90 tablet 0    lisinopril-hydroCHLOROthiazide 20-25 MG Oral Tab Take 1 tablet by mouth daily. 90 tablet 0    sertraline 50 MG Oral Tab Take 1 tablet (50 mg total) by mouth daily. 90 tablet 0    traZODone 100 MG Oral Tab Take 1 tablet (100 mg total) by mouth daily.        Ready to quit: Not Answered  Counseling given: Not Answered  Tobacco comments: Smokes few cigarettes per day        REVIEW OF SYSTEMS:   Consitutional: No fevers, chills, sweats  Eye: No recent visual problems  ENMT: No ear pain nasal congestion sore throat  Respiratory: No shortness of breath, cough  Cardiovascular: No chest pain palpitations syncope  Gastrointestinal: No nausea vomiting diarrhea  Genitourinary: No hematuria  Hema/Lymph no bruising tendency, swollen lymph glands  Endocrine: Negative for excessive thirst excessive hunger  Musculoskeletal: No back pain neck pain joint pain muscle pain decreased range of motion  Integumentary: No rash, pruritus, abrasions  Neurologic: Alert and oriented x4  Psychiatric: No anxiety, depression    Medical, surgical, family, and social histories were reviewed      EXAM:   VITALS:   Vitals:    07/02/24 1011   BP: 112/62   Pulse: 79   Resp: 18   Temp: 97.5 °F (36.4 °C)      GENERAL: well developed, well nourished, in no apparent distress  SKIN: no rashes, no suspicious lesions: Cool and Dry  HEENT: atraumatic, normocephalic, ears and throat are clear    Ears: TM's clear and visible bilaterally, no excess cerumen or erythema.   EYES: Pupils equal round and reactive.  Extraocular motions intact no scleral icterus no injection or drainage  THROAT without erythema tonsillar hypertrophy or exudate.  Uvula midline airway patent  NECK: Given midline.  No JVD or lymphadenopathy supple nontender no meningeal signs   LUNGS: clear to auscultation sounds equal bilaterally no wheezes rales or rhonchi  CARDIO: Regular rate and rhythm without murmurs gallops or  rubs  PSYCHIATRIC: Awake, alert and oriented x3, cooperative appropriate mood and affect.  Judgment intact       ASSESSMENT AND PLAN:   1. Elevated glucose  - Empagliflozin (JARDIANCE) 25 MG Oral Tab; Take 25 mg by mouth daily.  Dispense: 90 tablet; Refill: 0  - Hemoglobin A1C; Future  I did discuss with the patient her hemoglobin A1c did go up.  She states that she has been having diarrhea with the metformin.  I did discuss for her to discontinue it.  She was given a prescription for Jardiance 25 mg once a day and she was asked to update her blood work in the next 3 months she will need a hemoglobin A1c  2. Hypercholesterolemia  - atorvastatin 40 MG Oral Tab; Take 1 tablet (40 mg total) by mouth nightly.  Dispense: 90 tablet; Refill: 0  - Lipid Panel; Future  - Comp Metabolic Panel (14); Future  Patient states that she has been taking her lovastatin as prescribed but her LDL did go up this may be due in part to the Abilify.  I did discuss for her to discontinue lovastatin she was given a prescription for atorvastatin 40 mg once a day.  She was asked to watch her fatty food fried food intake.  Will be updating her blood work in the next 3 months she will need a lipid panel as well as a CMP.    Meds & Refills for this Visit:  Requested Prescriptions     Signed Prescriptions Disp Refills    atorvastatin 40 MG Oral Tab 90 tablet 0     Sig: Take 1 tablet (40 mg total) by mouth nightly.    Empagliflozin (JARDIANCE) 25 MG Oral Tab 90 tablet 0     Sig: Take 25 mg by mouth daily.       Health Maintenance:  Health Maintenance Due   Topic Date Due    Annual Physical  Never done    Pneumococcal Vaccine: Birth to 64yrs (1 of 2 - PCV) Never done    Pap Smear  Never done    COVID-19 Vaccine (3 - 2023-24 season) 09/01/2023    Tobacco Cessation Counseling  Never done       Patient/Caregiver Education: Patient/Caregiver Education: There are no barriers to learning. Medical education done.   Outcome: Patient verbalizes understanding.  Patient is notified to call with any questions, complications, allergies, or worsening or changing symptoms.  Patient is to call with any side effects or complications from the treatments as a result of today.     Problem List:  Patient Active Problem List   Diagnosis    BMI 30.0-30.9,adult    Benign essential HTN    GARCIA (obstructive sleep apnea)         No follow-ups on file.     Rakesh Ho MD    Please note that portions of this note may have been completed with a voice recognition program. Efforts were made to edit the dictations but occasionally words are mis-transcribed.

## 2024-10-30 ENCOUNTER — OFFICE VISIT (OUTPATIENT)
Dept: INTERNAL MEDICINE CLINIC | Facility: CLINIC | Age: 44
End: 2024-10-30
Payer: COMMERCIAL

## 2024-10-30 VITALS
WEIGHT: 206.38 LBS | HEART RATE: 83 BPM | HEIGHT: 67.32 IN | TEMPERATURE: 98 F | SYSTOLIC BLOOD PRESSURE: 128 MMHG | RESPIRATION RATE: 16 BRPM | OXYGEN SATURATION: 98 % | BODY MASS INDEX: 32.01 KG/M2 | DIASTOLIC BLOOD PRESSURE: 82 MMHG

## 2024-10-30 DIAGNOSIS — I10 PRIMARY HYPERTENSION: Primary | ICD-10-CM

## 2024-10-30 DIAGNOSIS — F31.61 BIPOLAR DISORDER, CURRENT EPISODE MIXED, MILD (HCC): ICD-10-CM

## 2024-10-30 DIAGNOSIS — G47.00 INSOMNIA, UNSPECIFIED TYPE: ICD-10-CM

## 2024-10-30 DIAGNOSIS — E78.00 PURE HYPERCHOLESTEROLEMIA: ICD-10-CM

## 2024-10-30 DIAGNOSIS — R73.03 PREDIABETES: ICD-10-CM

## 2024-10-30 DIAGNOSIS — E66.09 CLASS 1 OBESITY DUE TO EXCESS CALORIES WITH SERIOUS COMORBIDITY AND BODY MASS INDEX (BMI) OF 32.0 TO 32.9 IN ADULT: ICD-10-CM

## 2024-10-30 DIAGNOSIS — E66.811 CLASS 1 OBESITY DUE TO EXCESS CALORIES WITH SERIOUS COMORBIDITY AND BODY MASS INDEX (BMI) OF 32.0 TO 32.9 IN ADULT: ICD-10-CM

## 2024-10-30 DIAGNOSIS — G47.33 OBSTRUCTIVE SLEEP APNEA SYNDROME: ICD-10-CM

## 2024-10-30 DIAGNOSIS — D72.829 LEUKOCYTOSIS, UNSPECIFIED TYPE: ICD-10-CM

## 2024-10-30 PROCEDURE — 99204 OFFICE O/P NEW MOD 45 MIN: CPT | Performed by: NURSE PRACTITIONER

## 2024-10-30 RX ORDER — LISINOPRIL AND HYDROCHLOROTHIAZIDE 20; 25 MG/1; MG/1
1 TABLET ORAL DAILY
Qty: 90 TABLET | Refills: 0 | Status: SHIPPED | OUTPATIENT
Start: 2024-10-30

## 2024-10-30 RX ORDER — TRAZODONE HYDROCHLORIDE 100 MG/1
100 TABLET ORAL DAILY
Qty: 90 TABLET | Refills: 0 | Status: SHIPPED | OUTPATIENT
Start: 2024-10-30

## 2024-10-30 RX ORDER — BUSPIRONE HYDROCHLORIDE 10 MG/1
10 TABLET ORAL 3 TIMES DAILY
Qty: 270 TABLET | Refills: 0 | Status: SHIPPED | OUTPATIENT
Start: 2024-10-30

## 2024-10-30 RX ORDER — MULTIVITAMIN
1 TABLET ORAL DAILY
COMMUNITY

## 2024-10-30 NOTE — PATIENT INSTRUCTIONS
Fluoxetine/prozac is the least for the weight gain     See psychiatry as soon as possible    Continue your current medication    See bony Torres     Most insurance providers require a diagnosis of type 2 diabetes for medications such as Mounjaro or Ozempic. We ask that you please contact your insurance provider to see which medications are covered for weight loss and for what specific diagnosis. Some medications to ask about that may be helpful are: Zepbound, Wegovy, Mounjaro, Ozempic and Trulicity. Please note, sometimes insurance will say a medication is covered with a prior-authorization, this does not always mean that the prior authorization will be approved. Please reach back out or schedule an appointment once you have spoken to insurance.      My fitness pal betzaida for nutrition    Increase your exercise, do weight lifting as well    Get the echo done    Get your labs done a month after starting any new medicine. You should be fasting for at least 10 hours. If you take a multivitamin with Biotin or any biotin product it should be held for 3 days prior to getting your labs done.     Follow up in 1 month after starting your medicine for your annual and med check or sooner as needed

## 2024-10-30 NOTE — PROGRESS NOTES
[facility]    CHIEF COMPLAINT:    Chief Complaint   Patient presents with    [facility]     New Patient     Medication Follow-Up         HISTORY OF PRESENT ILLNESS:  The patient is a 43 year old year old female who presents to [facility].    She has a hx of HTN, HLD, prediabetes, obesity, sleep apnea, bipolar disorder, insomnia.     HTN- - Stable. No headaches or vision changes. No SE to medication. Taking medication as prescribed.     HLD- was on statin. Is not taking one now.    Prediabetes- on jardiance. Is not sure of her dose. Urinates a lot. No other SE.     Obesity- has been working on lifestyle changes for > 3 months. Tried metformin with too many SE. Is losing weight slowly.     Sleep apnea- not using cpap. Wants a new pulmonologist. Is losing weight.      Bipolar disorder/insomnia- Stopped abilify due to weight [place]. Mood is worse now. No SE to busripone and trazodone. Needs to fu with psych. Would like a new referral. No SI or HI.     Had elevated WBC with last labs    Medical, surgical, social and family hx reviewed in detail       Past Medical History:   Past Medical History:    Arthritis    Bipolar I disorder (HCC)    Essential hypertension    HTN (hypertension)    Tobacco abuse    Type II diabetes mellitus (HCC)        Past Surgical History:   Past Surgical History:   Procedure Laterality Date    Laparoscopic cholecystectomy         Current Medications:    Current Outpatient Medications   Medication Sig Dispense Refill    Multiple Vitamin Oral Tab Take 1 tablet by mouth daily.      ARIPiprazole 5 MG Oral Tab Take 1 tablet (5 mg total) by mouth daily.      busPIRone 10 MG Oral Tab Take 1 tablet (10 mg total) by mouth 3 (three) times daily.      lisinopril-hydroCHLOROthiazide 20-25 MG Oral Tab Take 1 tablet by mouth daily. 90 tablet 0    sertraline 50 MG Oral Tab Take 1 tablet (50 mg total) by mouth daily. 90 tablet 0    traZODone 100 MG Oral Tab Take 1 tablet (100 mg total) by mouth  daily.            Allergies:    Allergies as of 10/30/2024 - Review Complete 10/30/2024   Allergen Reaction Noted    Metformin DIARRHEA 07/02/2024       SOCIAL HISTORY:    Social History     Socioeconomic History    Marital status:      Spouse name: Not on file    Number of children: Not on file    Years of education: Not on file    Highest education level: Not on file   Occupational History    Occupation: CNA     Comment: and nursing student   Tobacco Use    Smoking status: Some Days     Types: Cigarettes     Start date: 2014    Smokeless tobacco: Never    Tobacco comments:     Smokes few cigarettes per day    Vaping Use    Vaping status: Never Used   Substance and Sexual Activity    Alcohol use: Not Currently    Drug use: Not Currently    Sexual activity: Not on file   Other Topics Concern    Not on file   Social History Narrative    Not on file     Social Drivers of Health     Financial Resource Strain: Not on file   Food Insecurity: Not on file   Transportation Needs: Not on file   Physical Activity: Not on file   Stress: Not on file   Social Connections: Not on file   Housing Stability: Not on file       FAMILY HISTORY:   Family History   Problem Relation Age of Onset    Heart Disorder Father     Hypertension Father     Diabetes Father     Depression Mother     Cancer Maternal Grandfather     Ovarian Cancer Paternal Grandmother     No Known Problems Brother     Bipolar Disorder Son        REVIEW OF SYSTEMS:  Review of Systems   Constitutional: Negative.    HENT: Negative.     Eyes: Negative.    Respiratory: Negative.     Cardiovascular: Negative.    Gastrointestinal: Negative.    Endocrine: Negative.    Genitourinary: Negative.    Musculoskeletal: Negative.    Skin: Negative.    Allergic/Immunologic: Negative.    Neurological: Negative.    Hematological: Negative.    Psychiatric/Behavioral:  Positive for agitation and sleep disturbance. The patient is nervous/anxious.      EXERCISE ASSESSMENT AND  COUNSELING  Yes     PAIN ASSESSMENT (Patient reports Pain):No       FALL ASSESSMENT:    Falls in the last 12 months: No    FUNCTIONAL ASSESSMENT (Able to perform all ADLs):  Yes    BODY HABITUS AND NUTRITION STATUS:  Body mass index is 32.02 kg/m².    DEPRESSION ASSESSMENT:  Yes    PHYSICAL EXAM:     /82 (BP Location: Left arm, Patient Position: Sitting, Cuff Size: adult)   Pulse 83   Temp 97.8 °F (36.6 °C) (Temporal)   Resp 16   Ht 5' 7.32\" (1.71 m)   Wt 206 lb 6.4 oz (93.6 kg)   LMP 04/03/2024 (Approximate)   SpO2 98%   BMI 32.02 kg/m²   Body mass index is 32.02 kg/m².   GENERAL: well developed, well nourished,in no apparent distress  HEENT: atraumatic, normocephalic  LUNGS: clear to auscultation; no rhonchi, rales, or wheezing  CARDIO: RRR without murmur  GI: good BS's, no masses, organomegaly or tenderness  MUSCULOSKELETAL: No obvious joint deformity or swelling.  Normal gait.  EXTREMITIES: no edema  NEURO: Oriented times three      Labs:   Lab Results   Component Value Date/Time    WBC 11.5 (H) 03/01/2024 09:30 AM    HGB 13.5 03/01/2024 09:30 AM    .0 (H) 03/01/2024 09:30 AM      Lab Results   Component Value Date/Time     (H) 06/27/2024 07:57 AM     06/27/2024 07:57 AM    K 3.6 06/27/2024 07:57 AM     06/27/2024 07:57 AM    CO2 26.0 06/27/2024 07:57 AM    CREATSERUM 0.62 06/27/2024 07:57 AM    CA 8.8 06/27/2024 07:57 AM    ALB 3.5 06/27/2024 07:57 AM    TP 7.7 06/27/2024 07:57 AM    ALKPHO 73 06/27/2024 07:57 AM    AST 14 (L) 06/27/2024 07:57 AM    ALT 37 06/27/2024 07:57 AM    BILT 0.3 06/27/2024 07:57 AM    TSH 2.680 03/01/2024 09:30 AM        Lab Results   Component Value Date/Time    CHOLEST 240 (H) 06/27/2024 07:57 AM    HDL 57 06/27/2024 07:57 AM    TRIG 211 (H) 06/27/2024 07:57 AM     (H) 06/27/2024 07:57 AM    NONHDLC 183 (H) 06/27/2024 07:57 AM       Lab Results   Component Value Date/Time    A1C 6.2 (H) 06/27/2024 07:57 AM      Vitamin D:    No results  found for: \"VITD\"      IMAGING:     No results found.     ASSESSMENT AND PLAN:   1. Primary hypertension  - - Stable. No headaches or vision changes. No SE to medication. Taking medication as prescribed.   - continue current medication   - get echo  - CARD ECHO 2D DOPPLER (CPT=93306); Future  - Comp Metabolic Panel (14); Future  - lisinopril-hydroCHLOROthiazide 20-25 MG Oral Tab; Take 1 tablet by mouth daily.  Dispense: 90 tablet; Refill: 0    2. Pure hypercholesterolemia  - continue lifestyle changes and repeat lab  - Lipid Panel; Future    3. Prediabetes  - low sugar diet and exercise  - failed metformin, on jardiance  - check coverage on GLP1 and was given info on SE.   - Hemoglobin A1C; Future  - empagliflozin (JARDIANCE) 10 MG Oral Tab; Take 1 tablet (10 mg total) by mouth daily.  Dispense: 90 tablet; Refill: 0    4. Class 1 obesity due to excess calories with serious comorbidity and body mass index (BMI) of 32.0 to 32.9 in adult  - continue lifestyle changes     5. Obstructive sleep apnea syndrome  - see pulmo  - Pulmonary Referral - In Network    6. Bipolar disorder, current episode mixed, mild (HCC)  7. Insomnia, unspecified type  - see psych  - meds refill temporarily until she sees psych   - traZODone 100 MG Oral Tab; Take 1 tablet (100 mg total) by mouth daily.  Dispense: 90 tablet; Refill: 0  - busPIRone 10 MG Oral Tab; Take 1 tablet (10 mg total) by mouth 3 (three) times daily.  Dispense: 270 tablet; Refill: 0  -  NAVIGATOR    8. Leukocytosis, unspecified type  - CBC With Differential With Platelet; Future       JACKLYN Pandya    Return in 1-2 months for annual or sooner as needed

## 2024-11-01 ENCOUNTER — TELEPHONE (OUTPATIENT)
Age: 44
End: 2024-11-01

## 2024-11-07 ENCOUNTER — TELEPHONE (OUTPATIENT)
Dept: INTERNAL MEDICINE CLINIC | Facility: CLINIC | Age: 44
End: 2024-11-07

## 2024-11-07 DIAGNOSIS — R73.03 PREDIABETES: Primary | ICD-10-CM

## 2024-11-07 DIAGNOSIS — E66.09 CLASS 1 OBESITY DUE TO EXCESS CALORIES WITH SERIOUS COMORBIDITY AND BODY MASS INDEX (BMI) OF 32.0 TO 32.9 IN ADULT: ICD-10-CM

## 2024-11-07 DIAGNOSIS — E66.811 CLASS 1 OBESITY DUE TO EXCESS CALORIES WITH SERIOUS COMORBIDITY AND BODY MASS INDEX (BMI) OF 32.0 TO 32.9 IN ADULT: ICD-10-CM

## 2024-11-07 DIAGNOSIS — I10 PRIMARY HYPERTENSION: ICD-10-CM

## 2024-11-07 RX ORDER — SEMAGLUTIDE 0.68 MG/ML
0.25 INJECTION, SOLUTION SUBCUTANEOUS WEEKLY
Qty: 1 EACH | Refills: 0 | Status: SHIPPED | OUTPATIENT
Start: 2024-11-07 | End: 2024-12-09

## 2024-11-07 NOTE — TELEPHONE ENCOUNTER
Pt has an apt tomorrow for starting GLP1 meds. Info was already given to her at last visit. Please ask her which med may be approved so I can order it. She can then see me in a month after labs are done for PE and med check. Thanks.

## 2024-11-07 NOTE — TELEPHONE ENCOUNTER
Cancelled patient appoinment for tomorrow.  Called. Aware. Endorses insurance indicated either ozempic or mounjaro. Patient prefers ozempic. Aware of blood work and fasting guidelines. Offered to schedule appoinment in month, patient to schedule on mychart/call back. Patient endorses once provider sends rx today okay to send her message. Thanks!

## 2024-11-12 ENCOUNTER — TELEPHONE (OUTPATIENT)
Age: 44
End: 2024-11-12

## 2024-11-12 ENCOUNTER — TELEPHONE (OUTPATIENT)
Dept: INTERNAL MEDICINE CLINIC | Facility: CLINIC | Age: 44
End: 2024-11-12

## 2024-11-12 NOTE — TELEPHONE ENCOUNTER
Good afternoon, Balbina,     I have made a second attempt to reach out to the patient and was unable to connect. I have left my contact information. I am closing the order at this time.     Thank you,     IRAM Mcclain, [facility]   Behavioral Health Navigator   [phone]

## 2024-11-18 ENCOUNTER — OFFICE VISIT (OUTPATIENT)
Facility: CLINIC | Age: 44
End: 2024-11-18
Payer: COMMERCIAL

## 2024-11-18 VITALS
RESPIRATION RATE: 16 BRPM | OXYGEN SATURATION: 100 % | DIASTOLIC BLOOD PRESSURE: 86 MMHG | SYSTOLIC BLOOD PRESSURE: 124 MMHG | HEART RATE: 80 BPM | WEIGHT: 206 LBS | HEIGHT: 67 IN | BODY MASS INDEX: 32.33 KG/M2

## 2024-11-18 DIAGNOSIS — G25.81 RESTLESS LEG SYNDROME: ICD-10-CM

## 2024-11-18 DIAGNOSIS — E66.09 CLASS 1 OBESITY DUE TO EXCESS CALORIES WITH SERIOUS COMORBIDITY AND BODY MASS INDEX (BMI) OF 32.0 TO 32.9 IN ADULT: ICD-10-CM

## 2024-11-18 DIAGNOSIS — G47.33 OBSTRUCTIVE SLEEP APNEA SYNDROME: Primary | ICD-10-CM

## 2024-11-18 DIAGNOSIS — E66.811 CLASS 1 OBESITY DUE TO EXCESS CALORIES WITH SERIOUS COMORBIDITY AND BODY MASS INDEX (BMI) OF 32.0 TO 32.9 IN ADULT: ICD-10-CM

## 2024-11-18 DIAGNOSIS — I10 PRIMARY HYPERTENSION: ICD-10-CM

## 2024-11-18 DIAGNOSIS — G47.00 INSOMNIA, UNSPECIFIED TYPE: ICD-10-CM

## 2024-11-18 NOTE — PROGRESS NOTES
[facility] PULMONARY  SLEEP PROGRESS NOTE        HPI:   This is a 44 year old female coming in for   Chief Complaint   Patient presents with    Obstructive Sleep Apnea (GARCIA)     31-90  Dme: [employer]   Mask: full face         HPI:     Restless sleeper  Needs extended script for CPAP so she can keep using it  Felt she did well with CPAP on her sleep study    Does note benefit- more rested, BP is better, better sleep    Taking trazodone for insomnia  Works as nurse tech and in nursing school     She has stopped drinking and smoking which has helped her sleep significantly      Takes trazodone around 10p  In bed 11p-12a, falls asleep 12-1a  Out of bed 6a  Nighttime awakenings none  Naps none  Caffeine occ soda     Denies leg cramps, headaches, tinnitus, chest pain, thoracic back pain, bloating, sleep walking, sleep talking    Teeth grinding- does not wear dental guard, getting implants  Restless legs- worse at night, affects ability to fall asleep, for over two years  Has heavy menses, was told that her iron levels have been low    Has dry mouth- worse with CPAP    DME company: [employer]  Mask type: FFM      4/16/2024 Split Night  Respiratory Analysis: During the baseline portion of the study, respiratory monitoring revealed an Apnea-Hypopnea Index (AHI) of 67.0, with an overall Respiratory Disturbance Index (RDI) of 67.0 events per hour. The REM AHI was 43.3. The supine AHI was 0 events per hour. The non-supine related AHI was 0 events per hour. The Central Apnea Index was 0. The oxygen maria teresa was 77.8% and the patient spent 4.8% of sleep time with oxygen levels below 88%. Supplemental oxygen was not used during the study.   During the CPAP portion of the study, respiratory monitoring revealed resolution of obstructive events with CPAP at 13 cm H2O. Supine REM was observed at this pressure.     Patient: Sleep review of systems today: see form.      Pt  PCP:  Enma Christine MD  No referring provider defined for this encounter.            No data to display                    Past Medical History:    Arthritis    Bipolar I disorder (HCC)    Essential hypertension    HTN (hypertension)    Tobacco abuse    Type II diabetes mellitus (HCC)     Past Surgical History:   Procedure Laterality Date    Laparoscopic cholecystectomy       Social History:  Social History     Social History Narrative    Not on file     Social History     Socioeconomic History    Marital status:    Occupational History    Occupation: CNA     Comment: and nursing student   Tobacco Use    Smoking status: Former     Current packs/day: 0.00     Average packs/day: 0.1 packs/day for 10.3 years (1.0 ttl pk-yrs)     Types: Cigarettes     Start date:      Quit date: 2024     Years since quittin.5    Smokeless tobacco: Never    Tobacco comments:     Smokes few cigarettes per week since age 20    Vaping Use    Vaping status: Never Used   Substance and Sexual Activity    Alcohol use: Yes     Comment: occasional 2 drinks per week.    Drug use: Yes     Types: Cannabis     Comment: gummies to sleep    Sexual activity: Yes     Partners: Female     Family History:  Family History   Problem Relation Age of Onset    Depression Mother     Heart Disorder Father     Hypertension Father     Diabetes Father     No Known Problems Brother     No Known Problems Maternal Grandmother     Cancer Maternal Grandfather     Ovarian Cancer Paternal Grandmother     No Known Problems Paternal Grandfather     Bipolar Disorder Son      Allergies:  Allergies[1]  Current Meds:  Current Outpatient Medications   Medication Sig Dispense Refill    semaglutide (OZEMPIC, 0.25 OR 0.5 MG/DOSE,) 2 MG/3ML Subcutaneous Solution Pen-injector Inject 0.25 mg into the skin once a week. 1 each 0    Multiple Vitamin Oral Tab Take 1 tablet by mouth daily.      lisinopril-hydroCHLOROthiazide 20-25 MG Oral Tab Take 1 tablet by mouth daily. 90 tablet 0    traZODone 100 MG Oral Tab Take 1 tablet (100 mg total) by mouth  daily. 90 tablet 0    empagliflozin (JARDIANCE) 10 MG Oral Tab Take 1 tablet (10 mg total) by mouth daily. 90 tablet 0    busPIRone 10 MG Oral Tab Take 1 tablet (10 mg total) by mouth 3 (three) times daily. 270 tablet 0      Counseling given: Not Answered  Tobacco comments: Smokes few cigarettes per week since age 20          Problem List:  Patient Active Problem List   Diagnosis    Primary hypertension    Obstructive sleep apnea syndrome- severe, AHI 67    Pure hypercholesterolemia    Prediabetes    Class 1 obesity due to excess calories with serious comorbidity and body mass index (BMI) of 32.0 to 32.9 in adult    Bipolar disorder, current episode mixed, mild (HCC)    Insomnia    Restless leg syndrome       REVIEW OF SYSTEMS:   Review of Systems  See HPI    EXAM:   /86 (BP Location: Right arm, Patient Position: Sitting, Cuff Size: adult)   Pulse 80   Resp 16   Ht 5' 7\" (1.702 m)   Wt 206 lb (93.4 kg)   LMP 04/03/2024 (Approximate)   SpO2 100%   BMI 32.26 kg/m²  Estimated body mass index is 32.26 kg/m² as calculated from the following:    Height as of this encounter: 5' 7\" (1.702 m).    Weight as of this encounter: 206 lb (93.4 kg).   Neck in inches:      Wt Readings from Last 6 Encounters:   11/18/24 206 lb (93.4 kg)   10/30/24 206 lb 6.4 oz (93.6 kg)   07/02/24 221 lb (100.2 kg)   04/30/24 218 lb (98.9 kg)   03/15/24 215 lb (97.5 kg)   12/12/23 223 lb (101.2 kg)     BP Readings from Last 3 Encounters:   11/18/24 124/86   10/30/24 128/82   07/02/24 112/62     Pulse Readings from Last 3 Encounters:   11/18/24 80   10/30/24 83   07/02/24 79     SpO2 Readings from Last 3 Encounters:   11/18/24 100%   10/30/24 98%   04/30/24 98%      Patient weight not recorded    Vital signs reviewed.  Physical Exam  Vitals and nursing note reviewed.   Constitutional:       Appearance: Normal appearance. She is obese.   HENT:      Head: Normocephalic and atraumatic.      Right Ear: External ear normal.      Left Ear:  External ear normal.   Pulmonary:      Effort: Pulmonary effort is normal. No respiratory distress.   Musculoskeletal:      Cervical back: Normal range of motion and neck supple.   Neurological:      General: No focal deficit present.      Mental Status: She is alert and oriented to person, place, and time.   Psychiatric:         Attention and Perception: Attention and perception normal.         Mood and Affect: Mood and affect normal.         Speech: Speech normal.         Behavior: Behavior normal. Behavior is cooperative.         Thought Content: Thought content normal.         Cognition and Memory: Cognition and memory normal.         Judgment: Judgment normal.         ASSESSMENT AND PLAN:   1. Obstructive sleep apnea syndrome- severe, AHI 67  - Discussed compliance- she will work on putting the mask on nightly  - Ordered new mask for patient as she feels leakage   - Adjust pressure from 8-14cwp to 10-14cwp as she feels she's not getting enough air  - RTO in 3 months    2. Insomnia, unspecified type  - Takes trazodone with benefit    3. Class 1 obesity due to excess calories with serious comorbidity and body mass index (BMI) of 32.0 to 32.9 in adult    4. Primary hypertension  - /86    5. Restless leg syndrome  - Iron And Tibc  - Ferritin  - Vitamin B12 [E]  - Update labs. TSH already checked recently. Replace iron via supplements or transfusion based on severity. RTO in 3 months.    There are no Patient Instructions on file for this visit.    Independent interpretation of Sleep Download as defined above.  Continue with Rx management of Sleep apnea with PAP therapy.    COMPLIANCE is required by insurance for 4 hours a night most nights of the week.    Advised if still with sleep apnea and not using CPAP has a 7 fold increase in risk of heart attack, stroke, abnormal heart rhythm  and death,  increased risk of driving accidents.     Advised to refrain from driving when sleepy.      Recommend weight loss,  and maintain and optimal BMI with Exercise 30 minutes most days to target heart rate .     Advised patient to change filters,masks,hoses  and tubes and equiptment on a  regular schedule.    Filters and seals shall be changed every 1 month,  Hoses every 3 months,   CPAP mask and humidifier chamber changed every 6 month  with the durable medical equipment provider.         Meds & Refills for this Visit:  Requested Prescriptions      No prescriptions requested or ordered in this encounter       Outcome: Parent verbalizes understanding. Parent is notified to call with any questions, complications, allergies, or worsening or changing symptoms.  Parent is to call with any side effects or complications from the treatments as a result of today.     \" This note was created utilizing Dragon speech recognition software.  Please excuse any grammatical errors. Call my office if you have any questions regarding this note. \"     Didi KENNEDY LPN  11/18/2024  8:48 AM         [1]   Allergies  Allergen Reactions    Metformin DIARRHEA

## 2024-11-19 ENCOUNTER — TELEPHONE (OUTPATIENT)
Facility: CLINIC | Age: 44
End: 2024-11-19

## 2024-11-19 NOTE — TELEPHONE ENCOUNTER
Radha Elkins PA-C  P [facility] Pulmonary Sleep Staff  Please order 1 new mask for patient    Detailed mychart message sent to pt, pap device ordered to [facility] via [employer].  DME will verify insurance and once approved will contact pt to arrange delivery date/time.      [phone] (home)

## 2024-11-19 NOTE — TELEPHONE ENCOUNTER
Message note from 11/18/2024  Radha Elkins PA-C P [facility] Pulmonary Sleep Staff  Please increase pressure from 8-14cwp to 10-14cwp    11/19/2024, 2:47 PM    by MAICOL DEY LPN    Settings sent to device    Request [id number]    Set Mode to AutoSet  Set Min Pressure to 10 cmH2O  Set Max Pressure to 14 cmH2O  Set Response to Standard  Set EPR to Fulltime  Set EPR level to 3  Set Ramp enable to Off  Set Climate Control to Manual  Set Humidifier level to 4  Set Tube temperature to 80°F (27°C)  06/21/2024, 12:15 AM    Settings confirmed on device    Set Mode to AutoSet  Set Min Pressure to 8 cmH2O  Set Max Pressure to 14 cmH2O  Set Response to Standard  Set EPR to Fulltime  Set EPR level to 3  Set Ramp enable to Off  Set Climate Control to Manual  Set Humidifier level to 4  Set Tube temperature to 80°F (27°C)    [phone] (home)

## 2024-11-20 ENCOUNTER — TELEPHONE (OUTPATIENT)
Facility: CLINIC | Age: 44
End: 2024-11-20

## 2024-11-20 DIAGNOSIS — G47.33 OBSTRUCTIVE SLEEP APNEA SYNDROME: Primary | ICD-10-CM

## 2024-11-20 DIAGNOSIS — I10 PRIMARY HYPERTENSION: ICD-10-CM

## 2024-11-20 DIAGNOSIS — R06.83 SNORING: ICD-10-CM

## 2024-11-20 DIAGNOSIS — G47.10 HYPERSOMNOLENCE: ICD-10-CM

## 2024-11-20 DIAGNOSIS — G47.00 INSOMNIA, UNSPECIFIED TYPE: ICD-10-CM

## 2024-11-20 NOTE — TELEPHONE ENCOUNTER
Patient was told by DME company that  she is non compliant. She is unhappy with the current DME company and would like to switch.

## 2024-11-21 NOTE — TELEPHONE ENCOUNTER
IMPRESSIONS: This study reveals obstructive sleep apnea and was a successful CPAP titration.   Diagnosis: Obstructive Sleep Apnea G47.33   RECOMMENDATIONS:   1. It is recommended that the patient should be prescribed APAP at 8-14cm H2O, with humidity at 3 and EPR on.   2. During the titration, the patient was fitted with a [employer] Vitera mask, size Medium.   3. The patient should avoid alcohol and sedative medications, as these may worsen severity of symptoms.   4. The patient should avoid drowsy driving.   5. Patient to follow up with a sleep specialist in clinic.     Patient is restarting cpap therapy, cpap setting orders sent, along with [facility]  Pt is aware.

## 2024-11-21 NOTE — TELEPHONE ENCOUNTER
Spoke to pt and she said [facility] and wants to go with another [employer] company.       Spoke to Cara from [facility] and because of non compliance.  Pt will have to repeat sleep study for [facility] to take her.      Called [facility] to see if pt will require new sleep study to continue using her machine. Awaiting response.   Sophie [facility] confirmed pt does not need to repeat sleep study.  They just need updated prescription sent with recent office notes discussing pt's difficulty and to retry therapy again.   Informed pt .  She decided to stay with [facility].   Orders and office notes sent via [employer] .

## 2024-11-21 NOTE — TELEPHONE ENCOUNTER
Nurse s/w pt, aware office can switch dme to [facility], but a repeat sleep study is required.  Pt agreed, verbalize clear understanding, nurse provided [facility] number. Pt will call to schedule.    [phone] (home)

## 2024-12-08 DIAGNOSIS — E66.811 CLASS 1 OBESITY DUE TO EXCESS CALORIES WITH SERIOUS COMORBIDITY AND BODY MASS INDEX (BMI) OF 32.0 TO 32.9 IN ADULT: ICD-10-CM

## 2024-12-08 DIAGNOSIS — I10 PRIMARY HYPERTENSION: ICD-10-CM

## 2024-12-08 DIAGNOSIS — E66.09 CLASS 1 OBESITY DUE TO EXCESS CALORIES WITH SERIOUS COMORBIDITY AND BODY MASS INDEX (BMI) OF 32.0 TO 32.9 IN ADULT: ICD-10-CM

## 2024-12-08 DIAGNOSIS — R73.03 PREDIABETES: ICD-10-CM

## 2024-12-09 RX ORDER — SEMAGLUTIDE 0.68 MG/ML
0.25 INJECTION, SOLUTION SUBCUTANEOUS WEEKLY
Qty: 3 ML | Refills: 0 | Status: SHIPPED | OUTPATIENT
Start: 2024-12-09

## 2024-12-09 NOTE — TELEPHONE ENCOUNTER
Last OV relevant to medication: 10/30/24  Last refill date: 11/7/24 #1/refills: 0  When pt was asked to return for OV: 1-2 months for PE  Upcoming appt/reason:   Future Appointments   Date Time Provider Department Center   2/17/2025 10:00 AM Radha Elkins PA-C [facility]   Was pt informed of any over due labs: due-message sent  Lab Results   Component Value Date     (H) 06/27/2024    A1C 6.2 (H) 06/27/2024     Pt needs appointment. Please call to schedule thanks!

## 2024-12-12 RX ORDER — SEMAGLUTIDE 0.68 MG/ML
0.25 INJECTION, SOLUTION SUBCUTANEOUS WEEKLY
Qty: 3 ML | Refills: 0 | OUTPATIENT
Start: 2024-12-12

## 2025-01-07 ENCOUNTER — LAB ENCOUNTER (OUTPATIENT)
Dept: LAB | Age: 45
End: 2025-01-07
Attending: PHYSICIAN ASSISTANT
Payer: COMMERCIAL

## 2025-01-07 DIAGNOSIS — E66.811 CLASS 1 OBESITY DUE TO EXCESS CALORIES WITH SERIOUS COMORBIDITY AND BODY MASS INDEX (BMI) OF 32.0 TO 32.9 IN ADULT: ICD-10-CM

## 2025-01-07 DIAGNOSIS — E66.09 CLASS 1 OBESITY DUE TO EXCESS CALORIES WITH SERIOUS COMORBIDITY AND BODY MASS INDEX (BMI) OF 32.0 TO 32.9 IN ADULT: ICD-10-CM

## 2025-01-07 DIAGNOSIS — E78.00 PURE HYPERCHOLESTEROLEMIA: ICD-10-CM

## 2025-01-07 DIAGNOSIS — D72.829 LEUKOCYTOSIS, UNSPECIFIED TYPE: ICD-10-CM

## 2025-01-07 DIAGNOSIS — R73.03 PREDIABETES: ICD-10-CM

## 2025-01-07 DIAGNOSIS — I10 PRIMARY HYPERTENSION: ICD-10-CM

## 2025-01-07 LAB
ALBUMIN SERPL-MCNC: 4.3 G/DL (ref 3.2–4.8)
ALBUMIN/GLOB SERPL: 1.4 {RATIO} (ref 1–2)
ALP LIVER SERPL-CCNC: 79 U/L
ALT SERPL-CCNC: 27 U/L
ANION GAP SERPL CALC-SCNC: 11 MMOL/L (ref 0–18)
AST SERPL-CCNC: 35 U/L (ref ?–34)
BASOPHILS # BLD AUTO: 0.05 X10(3) UL (ref 0–0.2)
BASOPHILS NFR BLD AUTO: 0.6 %
BILIRUB SERPL-MCNC: 0.5 MG/DL (ref 0.3–1.2)
BUN BLD-MCNC: 16 MG/DL (ref 9–23)
CALCIUM BLD-MCNC: 9.4 MG/DL (ref 8.7–10.4)
CHLORIDE SERPL-SCNC: 101 MMOL/L (ref 98–112)
CHOLEST SERPL-MCNC: 228 MG/DL (ref ?–200)
CO2 SERPL-SCNC: 26 MMOL/L (ref 21–32)
CREAT BLD-MCNC: 0.78 MG/DL
DEPRECATED HBV CORE AB SER IA-ACNC: 30 NG/ML
EGFRCR SERPLBLD CKD-EPI 2021: 97 ML/MIN/1.73M2 (ref 60–?)
EOSINOPHIL # BLD AUTO: 0.28 X10(3) UL (ref 0–0.7)
EOSINOPHIL NFR BLD AUTO: 3.3 %
ERYTHROCYTE [DISTWIDTH] IN BLOOD BY AUTOMATED COUNT: 13.5 %
EST. AVERAGE GLUCOSE BLD GHB EST-MCNC: 111 MG/DL (ref 68–126)
FASTING PATIENT LIPID ANSWER: YES
FASTING STATUS PATIENT QL REPORTED: YES
GLOBULIN PLAS-MCNC: 3 G/DL (ref 2–3.5)
GLUCOSE BLD-MCNC: 116 MG/DL (ref 70–99)
HBA1C MFR BLD: 5.5 % (ref ?–5.7)
HCT VFR BLD AUTO: 40.2 %
HDLC SERPL-MCNC: 56 MG/DL (ref 40–59)
HGB BLD-MCNC: 13.3 G/DL
IMM GRANULOCYTES # BLD AUTO: 0.06 X10(3) UL (ref 0–1)
IMM GRANULOCYTES NFR BLD: 0.7 %
IRON SATN MFR SERPL: 18 %
IRON SERPL-MCNC: 61 UG/DL
LDLC SERPL CALC-MCNC: 142 MG/DL (ref ?–100)
LYMPHOCYTES # BLD AUTO: 1.95 X10(3) UL (ref 1–4)
LYMPHOCYTES NFR BLD AUTO: 22.6 %
MCH RBC QN AUTO: 30.4 PG (ref 26–34)
MCHC RBC AUTO-ENTMCNC: 33.1 G/DL (ref 31–37)
MCV RBC AUTO: 91.8 FL
MONOCYTES # BLD AUTO: 0.61 X10(3) UL (ref 0.1–1)
MONOCYTES NFR BLD AUTO: 7.1 %
NEUTROPHILS # BLD AUTO: 5.66 X10 (3) UL (ref 1.5–7.7)
NEUTROPHILS # BLD AUTO: 5.66 X10(3) UL (ref 1.5–7.7)
NEUTROPHILS NFR BLD AUTO: 65.7 %
NONHDLC SERPL-MCNC: 172 MG/DL (ref ?–130)
OSMOLALITY SERPL CALC.SUM OF ELEC: 288 MOSM/KG (ref 275–295)
PLATELET # BLD AUTO: 294 10(3)UL (ref 150–450)
POTASSIUM SERPL-SCNC: 3.4 MMOL/L (ref 3.5–5.1)
PROT SERPL-MCNC: 7.3 G/DL (ref 5.7–8.2)
RBC # BLD AUTO: 4.38 X10(6)UL
SODIUM SERPL-SCNC: 138 MMOL/L (ref 136–145)
TOTAL IRON BINDING CAPACITY: 346 UG/DL (ref 250–425)
TRANSFERRIN SERPL-MCNC: 274 MG/DL (ref 250–380)
TRIGL SERPL-MCNC: 169 MG/DL (ref 30–149)
VIT B12 SERPL-MCNC: 682 PG/ML (ref 211–911)
VLDLC SERPL CALC-MCNC: 32 MG/DL (ref 0–30)
WBC # BLD AUTO: 8.6 X10(3) UL (ref 4–11)

## 2025-01-07 PROCEDURE — 82607 VITAMIN B-12: CPT | Performed by: PHYSICIAN ASSISTANT

## 2025-01-07 PROCEDURE — 36415 COLL VENOUS BLD VENIPUNCTURE: CPT

## 2025-01-07 PROCEDURE — 83036 HEMOGLOBIN GLYCOSYLATED A1C: CPT

## 2025-01-07 PROCEDURE — 85025 COMPLETE CBC W/AUTO DIFF WBC: CPT

## 2025-01-07 PROCEDURE — 83550 IRON BINDING TEST: CPT | Performed by: PHYSICIAN ASSISTANT

## 2025-01-07 PROCEDURE — 80053 COMPREHEN METABOLIC PANEL: CPT

## 2025-01-07 PROCEDURE — 82728 ASSAY OF FERRITIN: CPT | Performed by: PHYSICIAN ASSISTANT

## 2025-01-07 PROCEDURE — 80061 LIPID PANEL: CPT

## 2025-01-07 PROCEDURE — 83540 ASSAY OF IRON: CPT | Performed by: PHYSICIAN ASSISTANT

## 2025-01-09 NOTE — TELEPHONE ENCOUNTER
Last OV relevant to medication: 10/30/24  Last refill date: 12/9/24 #3mL/refills: 0  When pt was asked to return for OV: 12/30/24  Upcoming appt/reason:   Future Appointments   Date Time Provider Department Center   2/17/2025 10:00 AM Radha Elkins PA-C [facility]   Was pt informed of any over due labs: utd  Lab Results   Component Value Date     01/07/2025    A1C 5.5 01/07/2025     Please call to schedule overdue annual physical exam/med check thanks!

## 2025-01-09 NOTE — TELEPHONE ENCOUNTER
I need to know if the med is working and if any SE so I can go up in the dose. If she is able to schedule for next week and wont run out it can wait until then. If she is going to run out ask her and forward the info to me.

## 2025-01-09 NOTE — TELEPHONE ENCOUNTER
Pt asked to be called next week to make Physical med check appointment with nikhil F[facility] Physical unknown

## 2025-02-06 RX ORDER — SEMAGLUTIDE 0.68 MG/ML
0.25 INJECTION, SOLUTION SUBCUTANEOUS WEEKLY
Qty: 3 ML | Refills: 0 | OUTPATIENT
Start: 2025-02-06

## 2025-02-20 ENCOUNTER — OFFICE VISIT (OUTPATIENT)
Dept: INTERNAL MEDICINE CLINIC | Facility: CLINIC | Age: 45
End: 2025-02-20
Payer: COMMERCIAL

## 2025-02-20 VITALS
OXYGEN SATURATION: 97 % | BODY MASS INDEX: 31 KG/M2 | RESPIRATION RATE: 16 BRPM | SYSTOLIC BLOOD PRESSURE: 122 MMHG | HEIGHT: 67 IN | HEART RATE: 98 BPM | DIASTOLIC BLOOD PRESSURE: 80 MMHG | WEIGHT: 197.5 LBS | TEMPERATURE: 97 F

## 2025-02-20 DIAGNOSIS — Z12.4 SCREENING FOR CERVICAL CANCER: ICD-10-CM

## 2025-02-20 DIAGNOSIS — E87.6 LOW SERUM POTASSIUM: ICD-10-CM

## 2025-02-20 DIAGNOSIS — R39.9 UTI SYMPTOMS: ICD-10-CM

## 2025-02-20 DIAGNOSIS — F10.10 ETOH ABUSE: ICD-10-CM

## 2025-02-20 DIAGNOSIS — R79.0 LOW FERRITIN: ICD-10-CM

## 2025-02-20 DIAGNOSIS — R10.9 GASTRIC PAIN: ICD-10-CM

## 2025-02-20 DIAGNOSIS — I10 PRIMARY HYPERTENSION: ICD-10-CM

## 2025-02-20 DIAGNOSIS — E66.811 CLASS 1 OBESITY DUE TO EXCESS CALORIES WITH SERIOUS COMORBIDITY AND BODY MASS INDEX (BMI) OF 32.0 TO 32.9 IN ADULT: ICD-10-CM

## 2025-02-20 DIAGNOSIS — Z12.31 ENCOUNTER FOR SCREENING MAMMOGRAM FOR BREAST CANCER: ICD-10-CM

## 2025-02-20 DIAGNOSIS — R73.03 PREDIABETES: ICD-10-CM

## 2025-02-20 DIAGNOSIS — E78.00 PURE HYPERCHOLESTEROLEMIA: ICD-10-CM

## 2025-02-20 DIAGNOSIS — K21.9 GASTROESOPHAGEAL REFLUX DISEASE, UNSPECIFIED WHETHER ESOPHAGITIS PRESENT: ICD-10-CM

## 2025-02-20 DIAGNOSIS — G47.00 INSOMNIA, UNSPECIFIED TYPE: ICD-10-CM

## 2025-02-20 DIAGNOSIS — E66.09 CLASS 1 OBESITY DUE TO EXCESS CALORIES WITH SERIOUS COMORBIDITY AND BODY MASS INDEX (BMI) OF 32.0 TO 32.9 IN ADULT: ICD-10-CM

## 2025-02-20 DIAGNOSIS — Z00.00 ENCOUNTER FOR ANNUAL PHYSICAL EXAM: Primary | ICD-10-CM

## 2025-02-20 DIAGNOSIS — F31.61 BIPOLAR DISORDER, CURRENT EPISODE MIXED, MILD (HCC): ICD-10-CM

## 2025-02-20 LAB
BILIRUB UR QL STRIP.AUTO: NEGATIVE
COLOR UR AUTO: YELLOW
GLUCOSE UR STRIP.AUTO-MCNC: NORMAL MG/DL
KETONES UR STRIP.AUTO-MCNC: NEGATIVE MG/DL
LEUKOCYTE ESTERASE UR QL STRIP.AUTO: 500
NITRITE UR QL STRIP.AUTO: NEGATIVE
PH UR STRIP.AUTO: 7 [PH] (ref 5–8)
PROT UR STRIP.AUTO-MCNC: 30 MG/DL
SP GR UR STRIP.AUTO: 1.03 (ref 1–1.03)
UROBILINOGEN UR STRIP.AUTO-MCNC: NORMAL MG/DL

## 2025-02-20 PROCEDURE — 81001 URINALYSIS AUTO W/SCOPE: CPT | Performed by: NURSE PRACTITIONER

## 2025-02-20 PROCEDURE — 87086 URINE CULTURE/COLONY COUNT: CPT | Performed by: NURSE PRACTITIONER

## 2025-02-20 PROCEDURE — 99396 PREV VISIT EST AGE 40-64: CPT | Performed by: NURSE PRACTITIONER

## 2025-02-20 PROCEDURE — 99214 OFFICE O/P EST MOD 30 MIN: CPT | Performed by: NURSE PRACTITIONER

## 2025-02-20 RX ORDER — LISINOPRIL AND HYDROCHLOROTHIAZIDE 20; 25 MG/1; MG/1
1 TABLET ORAL DAILY
Qty: 90 TABLET | Refills: 1 | Status: SHIPPED | OUTPATIENT
Start: 2025-02-20

## 2025-02-20 RX ORDER — TRAZODONE HYDROCHLORIDE 100 MG/1
100 TABLET ORAL NIGHTLY PRN
Qty: 30 TABLET | Refills: 0 | Status: SHIPPED | OUTPATIENT
Start: 2025-02-20

## 2025-02-20 RX ORDER — CEPHALEXIN 500 MG/1
500 CAPSULE ORAL 2 TIMES DAILY
Qty: 14 CAPSULE | Refills: 0 | Status: SHIPPED | OUTPATIENT
Start: 2025-02-20 | End: 2025-02-27

## 2025-02-20 RX ORDER — OMEPRAZOLE 40 MG/1
40 CAPSULE, DELAYED RELEASE ORAL DAILY
Qty: 30 CAPSULE | Refills: 0 | Status: SHIPPED | OUTPATIENT
Start: 2025-02-20

## 2025-02-20 NOTE — PROGRESS NOTES
CHIEF COMPLAINT   Complete physical, med check, other issues    HPI:   Jaylin Sanford is a 44 year old female who presents for a complete physical exam, med check, other issues.     Wt Readings from Last 6 Encounters:   02/20/25 197 lb 8 oz (89.6 kg)   11/18/24 206 lb (93.4 kg)   10/30/24 206 lb 6.4 oz (93.6 kg)   07/02/24 221 lb (100.2 kg)   04/30/24 218 lb (98.9 kg)   03/15/24 215 lb (97.5 kg)     Body mass index is 30.93 kg/m².     Diet and exercise are good. Vaccines reviewed. Wearing seat belt and no texting and driving. Feels safe at home. Pap due- wants to see gyne. Mammo to be ordered. Doing self breast exams. No smoking. 3-7 drinks of alcohol per day.   No skin concerns.  Labs reviewed.     HTN- - Stable. No headaches or vision changes. No SE to medication. Taking medication as prescribed. Low K with last labs      HLD- CVD risk is low    Elevated sugar- normal A1c. Could do better with diet.     GERD- daily. Has gastric pain as well. No n/v, constipation or diarrhea.     Obesity- could do better with lifestyle.     Low iron- in the past.     Bipolar disorder, insomnia- pt was to establish with psych after last OV months ago. Has not seen them yet. Needs a referral because he previous one is too far. Is agitated often per her report but no SI or HI. Needs a refill for trazodone. Is helping and no SE.     UTI- symptoms started a couple weeks ago with frequency, urgency, burning with urination. No blood in the urine, flank pain, fever or chills.           Cholesterol, Total (mg/dL)   Date Value   01/07/2025 228 (H)   06/27/2024 240 (H)   03/01/2024 250 (H)     HDL Cholesterol (mg/dL)   Date Value   01/07/2025 56   06/27/2024 57   03/01/2024 95 (H)     LDL Cholesterol (mg/dL)   Date Value   01/07/2025 142 (H)   06/27/2024 145 (H)   03/01/2024 132 (H)     AST (U/L)   Date Value   01/07/2025 35 (H)   06/27/2024 14 (L)   03/01/2024 33     ALT (U/L)   Date Value   01/07/2025 27   06/27/2024 37   03/01/2024 32         Current Outpatient Medications   Medication Sig Dispense Refill    Multiple Vitamin Oral Tab Take 1 tablet by mouth daily.      lisinopril-hydroCHLOROthiazide 20-25 MG Oral Tab Take 1 tablet by mouth daily. 90 tablet 0    traZODone 100 MG Oral Tab Take 1 tablet (100 mg total) by mouth daily. 90 tablet 0    busPIRone 10 MG Oral Tab Take 1 tablet (10 mg total) by mouth 3 (three) times daily. 270 tablet 0      Allergies[1]   Past Medical History:    Arthritis    Bipolar I disorder (HCC)    Essential hypertension    HTN (hypertension)    Tobacco abuse    Type II diabetes mellitus (HCC)      Past Surgical History:   Procedure Laterality Date    Laparoscopic cholecystectomy        Family History   Problem Relation Age of Onset    Depression Mother     Heart Disorder Father     Hypertension Father     Diabetes Father     No Known Problems Brother     No Known Problems Maternal Grandmother     Cancer Maternal Grandfather     Ovarian Cancer Paternal Grandmother     No Known Problems Paternal Grandfather     Bipolar Disorder Son       Social History:   Social History     Socioeconomic History    Marital status:    Occupational History    Occupation: CNA     Comment: and nursing student   Tobacco Use    Smoking status: Former     Current packs/day: 0.00     Average packs/day: 0.1 packs/day for 10.3 years (1.0 ttl pk-yrs)     Types: Cigarettes     Start date:      Quit date: 2024     Years since quittin.8    Smokeless tobacco: Never    Tobacco comments:     Smokes few cigarettes per week since age 20    Vaping Use    Vaping status: Never Used   Substance and Sexual Activity    Alcohol use: Yes     Comment: occasional 2 drinks per week.    Drug use: Yes     Types: Cannabis     Comment: gummies to sleep    Sexual activity: Yes     Partners: Female        REVIEW OF SYSTEMS:   GENERAL: feels well otherwise  SKIN: no complaint of any unusual skin lesions  EYES: no complaint of blurred vision or double  vision  HEENT: no complaint of nasal congestion, sinus pain or ST  LUNGS: no complaint of shortness of breath with exertion  CARDIOVASCULAR: no complaint of chest pain on exertion  GI: see HPI   : no complaints of vaginal discharge or urinary complaints  MUSCULOSKELETAL: no complaint of back pain  NEURO: no complaint of headaches  PSYCHE: See HPI   HEMATOLOGIC: denies hx of anemia    EXAM:   /80 (BP Location: Left arm, Patient Position: Sitting, Cuff Size: adult)   Pulse 98   Temp 97.1 °F (36.2 °C) (Temporal)   Resp 16   Ht 5' 7\" (1.702 m)   Wt 197 lb 8 oz (89.6 kg)   LMP 04/03/2024 (Approximate)   SpO2 97%   BMI 30.93 kg/m²   Body mass index is 30.93 kg/m².   GENERAL: well developed, well nourished, in no apparent distress  SKIN: no rashes, no suspicious lesions  HEENT: atraumatic, normocephalic, ears are clear  EYES:PERRLA, EOMI, conjunctiva are clear  NECK: supple,no adenopathy, no thyroid masses.  BREAST: DEFERRED TO GYNE  LUNGS: clear to auscultation; no rhonchi, rales, or wheezing  CARDIO: RRR without murmur  GI: gastric tenderness present  : DEFERRED TO GYNE   MUSCULOSKELETAL: No obvious joint deformity or swelling.  Normal gait.  EXTREMITIES: no edema or calve tenderness   NEURO: Oriented times three,cranial nerves are grossly intact,motor and sensory are grossly intact  PSYCH: GAD7= 18, PHQ9= CSSR=0       LABS:     Lab Results   Component Value Date    WBC 8.6 01/07/2025    RBC 4.38 01/07/2025    HGB 13.3 01/07/2025    HCT 40.2 01/07/2025    MCV 91.8 01/07/2025    MCH 30.4 01/07/2025    MCHC 33.1 01/07/2025    RDW 13.5 01/07/2025    .0 01/07/2025      Lab Results   Component Value Date     (H) 01/07/2025    BUN 16 01/07/2025    CREATSERUM 0.78 01/07/2025    ANIONGAP 11 01/07/2025    GFRNAA 96 07/30/2022    GFRAA 111 07/30/2022    CA 9.4 01/07/2025    OSMOCALC 288 01/07/2025    ALKPHO 79 01/07/2025    AST 35 (H) 01/07/2025    ALT 27 01/07/2025    BILT 0.5 01/07/2025    TP 7.3  01/07/2025    ALB 4.3 01/07/2025    GLOBULIN 3.0 01/07/2025     01/07/2025    K 3.4 (L) 01/07/2025     01/07/2025    CO2 26.0 01/07/2025      Lab Results   Component Value Date    CHOLEST 228 (H) 01/07/2025    TRIG 169 (H) 01/07/2025    HDL 56 01/07/2025     (H) 01/07/2025    VLDL 32 (H) 01/07/2025    NONHDLC 172 (H) 01/07/2025      Lab Results   Component Value Date    TSH 2.680 03/01/2024      Lab Results   Component Value Date     01/07/2025    A1C 5.5 01/07/2025       IMAGING:     No results found.     ASSESSMENT AND PLAN:   1. Encounter for annual physical exam  - Jaylin Sanford is a 44 year old female who presents for a complete physical exam.  Pap and pelvic deferred to gyne per patient request. Encouraged self breast exams. Mammogram ordered. Reviewed diet and exercise.   Pt' s weight is Body mass index is 30.93 kg/m².. Labs reviewed/ordered. Vaccines reviewed. No derm per pt.     2. Primary hypertension  - - Stable. No headaches or vision changes. No SE to medication. Taking medication as prescribed.   - continue current medication   - lisinopril-hydroCHLOROthiazide 20-25 MG Oral Tab; Take 1 tablet by mouth daily.  Dispense: 90 tablet; Refill: 1  - Comp Metabolic Panel (14) [E]; Future    3. Pure hypercholesterolemia  - CVD risk low  - low fat diet and exercise    4. Prediabetes  - low sugar diet and exercise    5. Class 1 obesity due to excess calories with serious comorbidity and body mass index (BMI) of 32.0 to 32.9 in adult  - lifestyle changes     6. Gastroesophageal reflux disease, unspecified whether esophagitis present  7. Gastric pain  - the patient has gastric pain on exam. Gerd daily. Etoh daily. Diet is not great.  - gerd precs  - start PPI  - see gastro for eval  - stop etoh  -Omeprazole 40 MG Oral Capsule Delayed Release; Take 1 capsule (40 mg total) by mouth daily.  Dispense: 30 capsule; Refill: 0  - GASTRO - INTERNAL    8. UTI symptoms  - start abt  - UA and CX  pending  - cephALEXin 500 MG Oral Cap; Take 1 capsule (500 mg total) by mouth 2 (two) times daily for 7 days.  Dispense: 14 capsule; Refill: 0  - Urinalysis, Routine; Future  - Urine Culture, Routine; Future  - Urinalysis, Routine  - Urine Culture, Routine    9. Bipolar disorder, current episode mixed, mild (HCC)  - see psych asap    10. Insomnia, unspecified type  - see psych asap  - temp refill of trazodone sent  - traZODone 100 MG Oral Tab; Take 1 tablet (100 mg total) by mouth nightly as needed for Sleep.  Dispense: 30 tablet; Refill: 0    11. ETOH abuse  - cessation discussed  - see psych  - BH NAVIGATOR    12. Low serum potassium  - repeat lab ordered     13. Low ferritin  - repeat lab  - increase iron in diet  - Ferritin; Future    14. Encounter for screening mammogram for breast cancer  - [facility] 2D+3D SCREENING BILAT (CPT=77067/95232); Future    15. Screening for cervical cancer  - see gyne  - OBG Referral - In Network       Follow up in  6 months for med check or sooner as needed  The patient indicates understanding of these issues and agrees to the plan.         [1]   Allergies  Allergen Reactions    Metformin DIARRHEA

## 2025-02-20 NOTE — PATIENT INSTRUCTIONS
COVID vaccine recommended     Flu shot recommended.      Start omeprazole daily 30 min before a meal. Monitor effectiveness and for side effects.     See gastroenterology if the stomach symptoms continue.     Continue your other current medication    Hydrate better and replace electrolytes     Increase potassium in your diet, increase the iron in your diet    Lower your alcohol intake    See psychiatry as soon as possible    Start therapy for mental health if able    Monitor closely for any suicidal thoughts.  Please call the 24-hour hotline for [facility] at  if you have any suicidal thoughts.  If you have suicidal thoughts with a plan please call 911 instead.      Make an appointment to see gynecology     Get your mammogram done in April    Get your lab done in a month. You should be fasting for at least 10 hours. If you take a multivitamin with Biotin or any biotin product it should be held for 3 days prior to getting your labs done.     Get your echocardiogram done    Take antibiotic completely as ordered     Take antibiotic with food    Eat yogurt twice a day while on antibiotic or take an oral probiotic    Monitor for diarrhea, side effects, allergic reaction    If you have a mild allergic reaction take benadryl and call the office. If it is severe and you have lip or throat swelling or trouble breathing go to the ER or call 911    Go to the ER for any emergent symptoms. If you cannot get there safely call 911.      Follow up in 6 months for your med check or sooner as needed

## 2025-02-24 ENCOUNTER — MED REC SCAN ONLY (OUTPATIENT)
Dept: INTERNAL MEDICINE CLINIC | Facility: CLINIC | Age: 45
End: 2025-02-24

## 2025-02-24 ENCOUNTER — TELEPHONE (OUTPATIENT)
Age: 45
End: 2025-02-24

## 2025-02-24 RX ORDER — ONDANSETRON 4 MG/1
4 TABLET, FILM COATED ORAL EVERY 8 HOURS PRN
Qty: 20 TABLET | Refills: 0 | Status: SHIPPED | OUTPATIENT
Start: 2025-02-24

## 2025-02-25 ENCOUNTER — TELEPHONE (OUTPATIENT)
Dept: INTERNAL MEDICINE CLINIC | Facility: CLINIC | Age: 45
End: 2025-02-25

## 2025-02-25 NOTE — TELEPHONE ENCOUNTER
Pt was in on 2/20/25 and Pt stated that she did miss 2 days of work. Her work is needing a return to work letter. Can be sent through [employer] and she will print it out. Call if any questions. Pt stated that she is feeling better  Thank you

## 2025-03-20 DIAGNOSIS — G47.00 INSOMNIA, UNSPECIFIED TYPE: ICD-10-CM

## 2025-03-20 RX ORDER — TRAZODONE HYDROCHLORIDE 100 MG/1
100 TABLET ORAL NIGHTLY PRN
Qty: 30 TABLET | Refills: 0 | Status: SHIPPED | OUTPATIENT
Start: 2025-03-20

## 2025-03-20 NOTE — TELEPHONE ENCOUNTER
Is this medication prescribed by the [facility] Providers? Yes     Did the patient contact the pharmacy directly?:  no     Is patient out of meds or supply very low?:  2 left     Medication Requested: traZODone 100 MG Oral Tab     Dose:      Is patient requesting a 30 or 90 day supply?:  30    Pharmacy name and phone # or location:  [id number] [place] [place] - [address] AT [facility] [address], [phone], [phone]     Is the patient due for an appointment?: no   (if so, please schedule appt)    Additional Notes:  Pt stated that she made appointment with Psych and cannot get in until 6/2/25. Asking for a refill until that date    Please advise the patient refills take up to 72 business hours.

## 2025-03-20 NOTE — TELEPHONE ENCOUNTER
Additional Notes:  Pt stated that she made appointment with Psych and cannot get in until 6/2/25. Asking for a refill until that date     Last OV relevant to medication: 2/20/25  Last refill date: 2/20/25 #30/refills: 0  When pt was asked to return for OV: 8/20/25  Upcoming appt/reason:   Future Appointments   Date Time Provider Department Center   3/25/2025  9:30 AM [facility] [place]   6/17/2025  9:00 AM Antonio Lee APRN [facility] [place]   Was pt informed of any over due labs: message sent

## 2025-03-25 ENCOUNTER — HOSPITAL ENCOUNTER (OUTPATIENT)
Dept: CV DIAGNOSTICS | Age: 45
Discharge: HOME | End: 2025-03-25
Attending: NURSE PRACTITIONER
Payer: COMMERCIAL

## 2025-03-25 DIAGNOSIS — I10 PRIMARY HYPERTENSION: ICD-10-CM

## 2025-03-25 PROCEDURE — 93306 TTE W/DOPPLER COMPLETE: CPT | Performed by: NURSE PRACTITIONER

## 2025-04-21 ENCOUNTER — LAB ENCOUNTER (OUTPATIENT)
Dept: LAB | Age: 45
End: 2025-04-21
Attending: NURSE PRACTITIONER
Payer: COMMERCIAL

## 2025-04-21 DIAGNOSIS — R79.0 LOW FERRITIN: ICD-10-CM

## 2025-04-21 DIAGNOSIS — I10 PRIMARY HYPERTENSION: ICD-10-CM

## 2025-04-21 DIAGNOSIS — R73.09 ELEVATED GLUCOSE: ICD-10-CM

## 2025-04-21 DIAGNOSIS — E78.00 HYPERCHOLESTEROLEMIA: ICD-10-CM

## 2025-04-21 LAB
ALBUMIN SERPL-MCNC: 4.2 G/DL (ref 3.2–4.8)
ALBUMIN/GLOB SERPL: 1.6 {RATIO} (ref 1–2)
ALP LIVER SERPL-CCNC: 55 U/L (ref 37–98)
ALT SERPL-CCNC: 28 U/L (ref 10–49)
ANION GAP SERPL CALC-SCNC: 9 MMOL/L (ref 0–18)
AST SERPL-CCNC: 22 U/L (ref ?–34)
BILIRUB SERPL-MCNC: 0.2 MG/DL (ref 0.3–1.2)
BUN BLD-MCNC: 15 MG/DL (ref 9–23)
CALCIUM BLD-MCNC: 9.4 MG/DL (ref 8.7–10.6)
CHLORIDE SERPL-SCNC: 104 MMOL/L (ref 98–112)
CHOLEST SERPL-MCNC: 196 MG/DL (ref ?–200)
CO2 SERPL-SCNC: 26 MMOL/L (ref 21–32)
CREAT BLD-MCNC: 0.81 MG/DL (ref 0.55–1.02)
DEPRECATED HBV CORE AB SER IA-ACNC: 29 NG/ML (ref 50–306)
EGFRCR SERPLBLD CKD-EPI 2021: 93 ML/MIN/1.73M2 (ref 60–?)
EST. AVERAGE GLUCOSE BLD GHB EST-MCNC: 120 MG/DL (ref 68–126)
FASTING PATIENT LIPID ANSWER: YES
FASTING STATUS PATIENT QL REPORTED: YES
GLOBULIN PLAS-MCNC: 2.7 G/DL (ref 2–3.5)
GLUCOSE BLD-MCNC: 113 MG/DL (ref 70–99)
HBA1C MFR BLD: 5.8 % (ref ?–5.7)
HDLC SERPL-MCNC: 63 MG/DL (ref 40–59)
LDLC SERPL CALC-MCNC: 109 MG/DL (ref ?–100)
NONHDLC SERPL-MCNC: 133 MG/DL (ref ?–130)
OSMOLALITY SERPL CALC.SUM OF ELEC: 290 MOSM/KG (ref 275–295)
POTASSIUM SERPL-SCNC: 4.2 MMOL/L (ref 3.5–5.1)
PROT SERPL-MCNC: 6.9 G/DL (ref 5.7–8.2)
SODIUM SERPL-SCNC: 139 MMOL/L (ref 136–145)
TRIGL SERPL-MCNC: 140 MG/DL (ref 30–149)
VLDLC SERPL CALC-MCNC: 24 MG/DL (ref 0–30)

## 2025-04-21 PROCEDURE — 83036 HEMOGLOBIN GLYCOSYLATED A1C: CPT

## 2025-04-21 PROCEDURE — 36415 COLL VENOUS BLD VENIPUNCTURE: CPT

## 2025-04-21 PROCEDURE — 80061 LIPID PANEL: CPT

## 2025-04-21 PROCEDURE — 82728 ASSAY OF FERRITIN: CPT

## 2025-04-21 PROCEDURE — 80053 COMPREHEN METABOLIC PANEL: CPT

## 2025-04-23 ENCOUNTER — TELEMEDICINE (OUTPATIENT)
Dept: INTERNAL MEDICINE CLINIC | Facility: CLINIC | Age: 45
End: 2025-04-23
Payer: COMMERCIAL

## 2025-04-23 DIAGNOSIS — R79.0 LOW FERRITIN: ICD-10-CM

## 2025-04-23 DIAGNOSIS — E55.9 VITAMIN D DEFICIENCY: ICD-10-CM

## 2025-04-23 DIAGNOSIS — R73.01 ELEVATED FASTING GLUCOSE: Primary | ICD-10-CM

## 2025-04-23 DIAGNOSIS — R63.8 ABNORMAL CRAVING: ICD-10-CM

## 2025-04-23 DIAGNOSIS — F41.9 ANXIETY: ICD-10-CM

## 2025-04-23 DIAGNOSIS — G47.00 INSOMNIA, UNSPECIFIED TYPE: ICD-10-CM

## 2025-04-23 PROCEDURE — 98005 SYNCH AUDIO-VIDEO EST LOW 20: CPT | Performed by: NURSE PRACTITIONER

## 2025-04-23 RX ORDER — TRAZODONE HYDROCHLORIDE 100 MG/1
100 TABLET ORAL NIGHTLY PRN
Qty: 30 TABLET | Refills: 0 | Status: SHIPPED | OUTPATIENT
Start: 2025-04-23

## 2025-04-23 RX ORDER — BUPROPION HYDROCHLORIDE 150 MG/1
150 TABLET ORAL DAILY
Qty: 90 TABLET | Refills: 0 | Status: SHIPPED | OUTPATIENT
Start: 2025-04-23 | End: 2025-04-28

## 2025-04-23 NOTE — PATIENT INSTRUCTIONS
Increase iron in your diet    Low sugar diet and exercise    If D supplement is needed take it as directed    See psych as planned    Do not restart drinking alcohol     Follow up in August for a med check or sooner as needed        Increase your hydration, be active, eat fruits and veggies, drink hot decaf liquids. If no improvement start metamucil daily. If no improvement start mirilax daily as needed. If still no improvement do prune punch (4oz of orange juice or apple if you have acid reflux, 4 oz of prune juice heated in the microwave together until hot and add a dose of milk of magnesia from over the counter) daily as needed.   Constipation (Adult)  Constipation means that you have bowel movements that are less frequent than usual. Stools often become very hard and difficult to pass.  Constipation is very common. At some point in life, it affects almost everyone. Since everyone's bowel habits are different, what is constipation to one person may not be to another. Your healthcare provider may do tests to diagnose constipation. It depends on what he or she finds when evaluating you.    Symptoms of constipation include:  Abdominal pain  Bloating  Vomiting  Painful bowel movements  Itching, swelling, bleeding, or pain around the anus  Causes  Constipation can have many causes. These include:  Diet low in fiber  Too much dairy  Not drinking enough liquids  Lack of exercise or physical activity (especially true for older adults)  Changes in lifestyle or daily routine, including pregnancy, aging, work, and travel  Frequent use or misuse of laxatives  Ignoring the urge to have a bowel movement or delaying it until later  Medicines, such as certain prescription pain medicines, iron supplements, antacids, certain antidepressants, and calcium supplements  Diseases like irritable bowel syndrome, bowel obstructions, stroke, diabetes, thyroid disease, Parkinson disease, hemorrhoids, and colon  cancer  Complications  Potential complications of constipation can include:  Hemorrhoids  Rectal bleeding from hemorrhoids or anal fissures (skin tears)  Hernias  Dependency on laxatives  Chronic constipation  Fecal impaction, a severe form of constipation in which a large amount of hard stool is in your rectum that you can't pass  Bowel obstruction or perforation  Home care  All treatment should be done after talking with your healthcare provider. This is especially true if you have another medical problems, are taking prescription medicines, or are an older adult. Treatment most often involves lifestyle changes. You may also need medicines. Your healthcare provider will tell you which will work best for you. Follow the advice below to help avoid this problem in the future.  Lifestyle changes  These lifestyle changes can help prevent constipation:  Diet. Eat a high-fiber diet, with fresh fruit and vegetables, and reduce dairy intake, meats, and processed foods  Fluids. It's important to get enough fluids each day. Drink plenty of water when you eat more fiber. If you are on diet that limits the amount of fluid you can have, talk about this with your healthcare provider.  Regular exercise. Check with your healthcare provider first.  Medicines  Take any medicines as directed. Some laxatives are safe to use only every now and then. Others can be taken on a regular basis. While laxatives don't cause bowel dependence, they are treating the symptoms. So your constipation may return if you don't make other changes. Talk with your healthcare provider or pharmacist if you have questions.  Prescription pain medicines can cause constipation. If you are taking this kind of medicine, ask your healthcare provider if you should also take a stool softener.  Medicines you may take to treat constipation include:  Fiber supplements  Stool softeners  Laxatives  Enemas  Rectal suppositories  Follow-up care  Follow up with your  healthcare provider if symptoms don't get better in the next few days. You may need to have more tests or see a specialist.  Call 911  Call 911 if any of these occur:  Trouble breathing  Stiff, rigid abdomen that is severely painful to touch  Confusion  Fainting or loss of consciousness  Rapid heart rate  Chest pain  When to seek medical advice  Call your healthcare provider right away if any of these occur:  Fever of 100.4°F (38°C) or higher, or as directed by your healthcare provider  Failure to resume normal bowel movements  Pain in your abdomen or back gets worse  Nausea or vomiting  Swelling in your abdomen  Blood in the stool  Black, tarry stool  Involuntary weight loss  Weakness  [employer] last reviewed this educational content on 6/1/2018  © 9965-7460 [employer]. All rights reserved. This information is not intended as a substitute for professional medical care. Always follow your healthcare professional's instructions.

## 2025-04-23 NOTE — PROGRESS NOTES
Virtual video Check-In    Jaylin Sanford verbally consents to a Virtual/video Check-In visit on 04/23/25.  Patient has been referred to the [facility] website at [email or web address] to review the yearly Consent to Treat document.    Patient understands and accepts financial responsibility for any deductible, co-insurance and/or co-pays associated with this service.    Duration of the service: 20 minutes    Jaylin Sanford is a 44 year old female.  CHIEF COMPLAINT   Lab review, med check    HPI:   Stopped drinking and started eating sugar. Is having a lot of cravings and would like medication to help this.     Anxiety/insomnia- less after stopping drinking. Has a psych apt soon. Current meds are helping and no SE.     Elevated sugar- worse since increasing sugar in diet. Is prediabetic range.    Would like d checked has been low    Iron def- is going to start iron tablet. No anemia.       Current Medications[1]   Past Medical History[2]   Social History:  Short Social Hx on File[3]     REVIEW OF SYSTEMS:   See HPI    EXAM:   Limited due to video visit  GENERAL: does not sound like they are in any acute distress on the video  LUNGS: They are able to phonate clearly without pausing due to sob, there was no coughing during the visit.  NEURO: Oriented times three      LABS:      Lab Results   Component Value Date    WBC 8.6 01/07/2025    RBC 4.38 01/07/2025    HGB 13.3 01/07/2025    HCT 40.2 01/07/2025    MCV 91.8 01/07/2025    MCH 30.4 01/07/2025    MCHC 33.1 01/07/2025    RDW 13.5 01/07/2025    .0 01/07/2025      Lab Results   Component Value Date     (H) 04/21/2025    BUN 15 04/21/2025    CREATSERUM 0.81 04/21/2025    ANIONGAP 9 04/21/2025    GFRNAA 96 07/30/2022    GFRAA 111 07/30/2022    CA 9.4 04/21/2025    OSMOCALC 290 04/21/2025    ALKPHO 55 04/21/2025    AST 22 04/21/2025    ALT 28 04/21/2025    BILT 0.2 (L) 04/21/2025    TP 6.9 04/21/2025    ALB 4.2 04/21/2025    GLOBULIN 2.7 04/21/2025      2025    K 4.2 2025     2025    CO2 26.0 2025      Lab Results   Component Value Date    CHOLEST 196 2025    TRIG 140 2025    HDL 63 (H) 2025     (H) 2025    VLDL 24 2025    NONHDLC 133 (H) 2025      Lab Results   Component Value Date    TSH 2.680 2024      Lab Results   Component Value Date     2025    A1C 5.8 (H) 2025        IMAGING:     CARD ECHO 2D DOPPLER (CPT=93306)  Result Date: 3/25/2025  Transthoracic Echocardiogram Name:Jaylin Sanford Date: 2025 :  10/31/1980 Ht:  (70in)  BP: 132 / 88 MRN:  [id number]    Age:  44years    Wt:  (192lb) HR: 66bpm Loc:  [facility]       Gndr: F          BSA: 2.05m^2 Sonographer: Mimi CAZARES AE, PE Ordering:    Balbina Cha Referring:   Balbina Cha ---------------------------------------------------------------------------- History/Indications:  Primary hypertension ---------------------------------------------------------------------------- Procedure information:  A transthoracic complete 2D study was performed. Additional evaluation included M-mode, complete spectral Doppler, and color Doppler.  Patient status:  Outpatient.  Location:  [facility]. This was a routine study. Transthoracic echocardiography for ventricular function evaluation. Image quality was adequate. ECG rhythm:   Normal sinus ---------------------------------------------------------------------------- Conclusions: 1. Left ventricle: The cavity size was normal. Wall thickness was normal.    Systolic function was normal. The estimated ejection fraction was 65-70%.    Left ventricular diastolic function parameters were normal for the    patient's age. 2. Right ventricle: The cavity size was normal. Systolic function was    normal. 3. Pulmonary arteries: Systolic pressure was within the normal range,    estimated to be 23mm Hg. 4. No significant valvular heart disease (stenosis or regurgitation)  Impressions:  No previous study from [facility] was available for comparison. * ---------------------------------------------------------------------------- * Findings: Left ventricle:  The cavity size was normal. Wall thickness was normal. Systolic function was normal. The estimated ejection fraction was 65-70%. No diagnostic evidence for diffuse regional wall motion abnormalities. Left ventricular diastolic function parameters were normal for the patient's age. Left atrium:  The atrium was normal in size. The left atrial volume was normal. Right ventricle:  The cavity size was normal. Systolic function was normal. Right atrium:  The atrium was normal in size. Mitral valve:  The valve was structurally normal. Leaflet separation was normal.  Doppler:  Transvalvular velocity was within the normal range. There was no evidence for stenosis. There was mild regurgitation. Aortic valve:  The valve was structurally normal. The valve was trileaflet. Cusp separation was normal.  Doppler:  Transvalvular velocity was within the normal range. There was no evidence for stenosis. There was no significant regurgitation. Tricuspid valve:  The valve is structurally normal. Leaflet separation was normal.  Doppler:  Transvalvular velocity was within the normal range. There was no evidence for stenosis. There was mild regurgitation. Pulmonic valve:   The valve is structurally normal. Cusp separation was normal.  Doppler:  Transvalvular velocity was within the normal range. There was no evidence for stenosis. There was no significant regurgitation. Pericardium:   There was no pericardial effusion. Aorta: Aortic root: The aortic root was normal-sized. Ascending aorta: The ascending aorta was normal. Pulmonary arteries: The main pulmonary artery was normal-sized. Systolic pressure was within the normal range, estimated to be 23mm Hg. Systemic veins: Inferior vena cava: The IVC was normally collapsible and normal-sized.  ---------------------------------------------------------------------------- Measurements  Left ventricle                    Value        Ref  IVS thickness, ED, PLAX           0.9   cm     0.6 -                                                 0.9  LV ID, ED, PLAX                   4.4   cm     3.8 -                                                 5.2  LV ID, ES, PLAX                   2.7   cm     2.2 -                                                 3.5  LV PW thickness, ED, PLAX         0.7   cm     0.6 -                                                 0.9  IVS/LV PW ratio, ED, PLAX         1.21         --------  LV PW/LV ID ratio, ED, PLAX       0.17         --------  LV ejection fraction              69    %      54 - 74  LV e', lateral                    13.4  cm/sec >=10.0  LV E/e', lateral                  6            <=13  LV e', medial                     8.2   cm/sec >=7.0  LV E/e', medial                   10           --------  LV e', average                    10.8  cm/sec --------  LV E/e', average                  8            <=14  Aortic root                       Value        Ref  Aortic root ID, ED                3.6   cm     2.7 -                                                 4.2  Ascending aorta                   Value        Ref  Ascending aorta ID, A-P, ED       3.3   cm     1.9 -                                                 3.5  Left atrium                       Value        Ref  LA ID, A-P, ES                    3.6   cm     2.7 -                                                 3.8  LA volume, S                  (H) 64    ml     22 - 52  LA volume/bsa, S                  31    ml/m^2 16 - 34  LA volume, ES, 1-p A4C        (H) 57    ml     22 - 52  LA volume, ES, 1-p A2C        (H) 63    ml     22 - 52  LA volume, ES, A/L                67    ml     --------  LA volume/bsa, ES, A/L            33    ml/m^2 16 - 34  LA/aortic root ratio              1            --------  Mitral valve                       Value        Ref  Mitral E-wave peak velocity       0.82  m/sec  --------  Mitral A-wave peak velocity       0.85  m/sec  --------  Mitral peak gradient, D           3     mm Hg  --------  Mitral E/A ratio, peak            1            --------  Pulmonary artery                  Value        Ref  PA pressure, S, DP                23    mm Hg  --------  Tricuspid valve                   Value        Ref  Tricuspid regurg peak             2.25  m/sec  <=2.8  velocity  Tricuspid peak RV-RA gradient     20    mm Hg  --------  Systemic veins                    Value        Ref  Estimated CVP                     3     mm Hg  --------  Right ventricle                   Value        Ref  RV pressure, S, DP                23    mm Hg  -------- Legend: (L)  and  (H)  burt values outside specified reference range. ---------------------------------------------------------------------------- Prepared and electronically signed by Tunde Moctezuma 03/25/2025 11:55        ASSESSMENT AND PLAN:   1. Elevated fasting glucose  - low sugar diet and exercise  - repeat lab  - Hemoglobin A1C [E]; Future    2. Vitamin D deficiency  - check level.   - Vitamin D [E]; Future  - Vitamin D [E]; Future    3. Low ferritin  - increase iron in diet  - Ferritin; Future    4. Anxiety  - start bupropion and monitor  - see psych as planned   - buPROPion  MG Oral Tablet 24 Hr; Take 1 tablet (150 mg total) by mouth daily.  Dispense: 90 tablet; Refill: 0    5. Insomnia, unspecified type  - refill sent.   - traZODone 100 MG Oral Tab; Take 1 tablet (100 mg total) by mouth nightly as needed for Sleep.  Dispense: 30 tablet; Refill: 0    6. Abnormal craving  - start wellbutrin and monitor. Wean off sugar.   - buPROPion  MG Oral Tablet 24 Hr; Take 1 tablet (150 mg total) by mouth daily.  Dispense: 90 tablet; Refill: 0     The patient indicates understanding of these issues and agrees to the plan.  The patient is asked to return as needed  or when routine care is due in August for med check        Please note that the following visit was completed using two-way, real-time interactive audio and/or video communication.  This has been done in good garth to provide continuity of care in the best interest of the provider-patient relationship, due to the ongoing public health crisis/national emergency and because of restrictions of visitation.  There are limitations of this visit as no physical exam could be performed.  Every conscious effort was taken to allow for sufficient and adequate time.  This billing was spent on reviewing labs, medications, radiology tests and decision making.  Appropriate medical decision-making and tests are ordered as detailed in the plan of care above.          [1]   Current Outpatient Medications   Medication Sig Dispense Refill    traZODone 100 MG Oral Tab Take 1 tablet (100 mg total) by mouth nightly as needed for Sleep. 30 tablet 0    ondansetron (ZOFRAN) 4 mg tablet Take 1 tablet (4 mg total) by mouth every 8 (eight) hours as needed for Nausea. 20 tablet 0    Omeprazole 40 MG Oral Capsule Delayed Release Take 1 capsule (40 mg total) by mouth daily. 30 capsule 0    lisinopril-hydroCHLOROthiazide 20-25 MG Oral Tab Take 1 tablet by mouth daily. 90 tablet 1    Multiple Vitamin Oral Tab Take 1 tablet by mouth daily.      busPIRone 10 MG Oral Tab Take 1 tablet (10 mg total) by mouth 3 (three) times daily. 270 tablet 0   [2]   Past Medical History:   Allergic rhinitis    Anxiety    Arthritis    Bipolar I disorder (HCC)    Depression    Essential hypertension    HTN (hypertension)    Obesity    Sleep apnea    Tobacco abuse    Type 1 diabetes mellitus (HCC)    Type II diabetes mellitus (HCC)   [3]   Social History  Socioeconomic History    Marital status:    Occupational History    Occupation: CNA     Comment: and nursing student   Tobacco Use    Smoking status: Former     Current packs/day: 0.00     Average packs/day: 0.1  packs/day for 10.3 years (1.0 ttl pk-yrs)     Types: Cigarettes     Start date:      Quit date: 2024     Years since quittin.9    Smokeless tobacco: Never    Tobacco comments:     Smokes few cigarettes per week since age 20    Vaping Use    Vaping status: Never Used   Substance and Sexual Activity    Alcohol use: Yes     Comment: occasional 2 drinks per week.    Drug use: Yes     Types: Cannabis     Comment: gummies to sleep    Sexual activity: Yes     Partners: Female

## 2025-04-28 DIAGNOSIS — I10 PRIMARY HYPERTENSION: ICD-10-CM

## 2025-04-28 DIAGNOSIS — F41.9 ANXIETY: ICD-10-CM

## 2025-04-28 DIAGNOSIS — G47.00 INSOMNIA, UNSPECIFIED TYPE: ICD-10-CM

## 2025-04-28 DIAGNOSIS — R63.8 ABNORMAL CRAVING: ICD-10-CM

## 2025-04-28 RX ORDER — TRAZODONE HYDROCHLORIDE 100 MG/1
100 TABLET ORAL NIGHTLY PRN
Qty: 30 TABLET | Refills: 0 | Status: CANCELLED | OUTPATIENT
Start: 2025-04-28

## 2025-04-28 RX ORDER — BUPROPION HYDROCHLORIDE 150 MG/1
150 TABLET ORAL DAILY
Qty: 90 TABLET | Refills: 0 | Status: SHIPPED | OUTPATIENT
Start: 2025-04-28

## 2025-04-28 RX ORDER — LISINOPRIL AND HYDROCHLOROTHIAZIDE 20; 25 MG/1; MG/1
1 TABLET ORAL DAILY
Qty: 90 TABLET | Refills: 1 | Status: SHIPPED | OUTPATIENT
Start: 2025-04-28

## 2025-04-28 RX ORDER — MULTIVITAMIN
1 TABLET ORAL DAILY
Refills: 0 | OUTPATIENT
Start: 2025-04-28

## 2025-04-28 NOTE — TELEPHONE ENCOUNTER
Trazodone is as needed and only 30 tablets so this must be filled by local. Other meds sent for 90 day supplies to [facility].

## 2025-04-28 NOTE — TELEPHONE ENCOUNTER
Please send 90 day supplies of these medications to [facility]

## 2025-05-07 ENCOUNTER — OFFICE VISIT (OUTPATIENT)
Dept: INTERNAL MEDICINE CLINIC | Facility: CLINIC | Age: 45
End: 2025-05-07
Payer: COMMERCIAL

## 2025-05-07 VITALS
DIASTOLIC BLOOD PRESSURE: 74 MMHG | TEMPERATURE: 98 F | HEIGHT: 67 IN | OXYGEN SATURATION: 98 % | WEIGHT: 203 LBS | SYSTOLIC BLOOD PRESSURE: 118 MMHG | BODY MASS INDEX: 31.86 KG/M2 | RESPIRATION RATE: 16 BRPM | HEART RATE: 83 BPM

## 2025-05-07 DIAGNOSIS — M79.671 BILATERAL FOOT PAIN: Primary | ICD-10-CM

## 2025-05-07 DIAGNOSIS — E66.811 CLASS 1 OBESITY DUE TO EXCESS CALORIES WITH SERIOUS COMORBIDITY AND BODY MASS INDEX (BMI) OF 32.0 TO 32.9 IN ADULT: ICD-10-CM

## 2025-05-07 DIAGNOSIS — R73.03 PREDIABETES: ICD-10-CM

## 2025-05-07 DIAGNOSIS — M79.672 BILATERAL FOOT PAIN: Primary | ICD-10-CM

## 2025-05-07 DIAGNOSIS — E66.09 CLASS 1 OBESITY DUE TO EXCESS CALORIES WITH SERIOUS COMORBIDITY AND BODY MASS INDEX (BMI) OF 32.0 TO 32.9 IN ADULT: ICD-10-CM

## 2025-05-07 PROCEDURE — 99214 OFFICE O/P EST MOD 30 MIN: CPT | Performed by: NURSE PRACTITIONER

## 2025-05-07 RX ORDER — TIRZEPATIDE 2.5 MG/.5ML
2.5 INJECTION, SOLUTION SUBCUTANEOUS WEEKLY
Qty: 2 ML | Refills: 0 | Status: SHIPPED | OUTPATIENT
Start: 2025-05-07

## 2025-05-07 NOTE — PROGRESS NOTES
Jaylin Sanford is a 44 year old female.  CHIEF COMPLAINT   Foot pain, weight     HPI:   Bilateral foot pain. Has swelling at times too. No injury. Is on her feet for 12-16 hours at a time for work.     Weight- would like to lose weight. Has been doing lifestyle changes for > 3 months.       Current Medications[1]   Past Medical History[2]   Social History:  Short Social Hx on File[3]     REVIEW OF SYSTEMS:   See HPI     EXAM:     /74 (BP Location: Right arm, Patient Position: Sitting, Cuff Size: adult)   Pulse 83   Temp 97.9 °F (36.6 °C) (Temporal)   Resp 16   Ht 5' 7\" (1.702 m)   Wt 203 lb (92.1 kg)   LMP 04/22/2025   SpO2 98%   BMI 31.79 kg/m²   Body mass index is 31.79 kg/m².   GENERAL: well developed, well nourished,in no apparent distress  HEENT: atraumatic, normocephalic,  LUNGS: clear to auscultation; no rhonchi, rales, or wheezing  CARDIO: RRR without murmur  GI: good BS's,no masses, organomegaly or tenderness  MUSCULOSKELETAL: No obvious joint deformity or swelling.  Normal gait.  EXTREMITIES: no edema  NEURO: Oriented times three    LABS:      Lab Results   Component Value Date    WBC 8.6 01/07/2025    RBC 4.38 01/07/2025    HGB 13.3 01/07/2025    HCT 40.2 01/07/2025    MCV 91.8 01/07/2025    MCH 30.4 01/07/2025    MCHC 33.1 01/07/2025    RDW 13.5 01/07/2025    .0 01/07/2025      Lab Results   Component Value Date     (H) 04/21/2025    BUN 15 04/21/2025    CREATSERUM 0.81 04/21/2025    ANIONGAP 9 04/21/2025    GFRNAA 96 07/30/2022    GFRAA 111 07/30/2022    CA 9.4 04/21/2025    OSMOCALC 290 04/21/2025    ALKPHO 55 04/21/2025    AST 22 04/21/2025    ALT 28 04/21/2025    BILT 0.2 (L) 04/21/2025    TP 6.9 04/21/2025    ALB 4.2 04/21/2025    GLOBULIN 2.7 04/21/2025     04/21/2025    K 4.2 04/21/2025     04/21/2025    CO2 26.0 04/21/2025      Lab Results   Component Value Date    CHOLEST 196 04/21/2025    TRIG 140 04/21/2025    HDL 63 (H) 04/21/2025     (H)  04/21/2025    VLDL 24 04/21/2025    NONHDLC 133 (H) 04/21/2025      Lab Results   Component Value Date    TSH 2.680 03/01/2024      Lab Results   Component Value Date     04/21/2025    A1C 5.8 (H) 04/21/2025        ASSESSMENT AND PLAN:   1. Bilateral foot pain  - likely over use due to being on feet for extended periods  - rest, ice, topical medications  - if no improvement   - PODIATRY - INTERNAL    2. Class 1 obesity due to excess calories with serious comorbidity and body mass index (BMI) of 32.0 to 32.9 in adult   3. Prediabetes  - low sugar diet, exercise  - start mounjaro and montior closely- see pt instructions  - get BMP in July  - send an update after a month  - Basic Metabolic Panel (8) [E]; Future  - Tirzepatide (MOUNJARO) 2.5 MG/0.5ML Subcutaneous Solution Auto-injector; Inject 2.5 mg into the skin once a week.  Dispense: 2 mL; Refill: 0      - Basic Metabolic Panel (8) [E]; Future  - Tirzepatide (MOUNJARO) 2.5 MG/0.5ML Subcutaneous Solution Auto-injector; Inject 2.5 mg into the skin once a week.  Dispense: 2 mL; Refill: 0      The patient indicates understanding of these issues and agrees to the plan.  The patient is asked to return in 2 months for weight if mounjaro covered or in August for a med check.       [1]   Current Outpatient Medications   Medication Sig Dispense Refill    lisinopril-hydroCHLOROthiazide 20-25 MG Oral Tab Take 1 tablet by mouth daily. 90 tablet 1    buPROPion  MG Oral Tablet 24 Hr Take 1 tablet (150 mg total) by mouth daily. 90 tablet 0    traZODone 100 MG Oral Tab Take 1 tablet (100 mg total) by mouth nightly as needed for Sleep. 30 tablet 0    ondansetron (ZOFRAN) 4 mg tablet Take 1 tablet (4 mg total) by mouth every 8 (eight) hours as needed for Nausea. 20 tablet 0    Omeprazole 40 MG Oral Capsule Delayed Release Take 1 capsule (40 mg total) by mouth daily. 30 capsule 0    Multiple Vitamin Oral Tab Take 1 tablet by mouth daily.      busPIRone 10 MG Oral Tab Take  1 tablet (10 mg total) by mouth 3 (three) times daily. 270 tablet 0   [2]   Past Medical History:   Allergic rhinitis    Anxiety    Arthritis    Bipolar I disorder (HCC)    Depression    Essential hypertension    HTN (hypertension)    Obesity    Sleep apnea    Tobacco abuse    Type 1 diabetes mellitus (HCC)    Type II diabetes mellitus (HCC)   [3]   Social History  Socioeconomic History    Marital status:    Occupational History    Occupation: CNA     Comment: and nursing student   Tobacco Use    Smoking status: Former     Current packs/day: 0.00     Average packs/day: 0.1 packs/day for 10.3 years (1.0 ttl pk-yrs)     Types: Cigarettes     Start date:      Quit date: 2024     Years since quittin.0     Passive exposure: Past    Smokeless tobacco: Never    Tobacco comments:     Smokes few cigarettes per week since age 20    Vaping Use    Vaping status: Never Used   Substance and Sexual Activity    Alcohol use: Yes     Comment: occasional 2 drinks per week.    Drug use: Yes     Types: Cannabis     Comment: gummies to sleep    Sexual activity: Yes     Partners: Female

## 2025-05-07 NOTE — PATIENT INSTRUCTIONS
Start mounjaro if covered. Watch for side effects, effectiveness and allergy    Get your labs done after July 23rd. You should be fasting for at least 10 hours. If you take a multivitamin with Biotin or any biotin product it should be held for 3 days prior to getting your labs done.     Do not walk barefoot even at home.     Wear comfortable shoes    Use ice, rest, topical medications like voltaren gel and lidocaine as needed    See podiatry if no improvement    Follow up in 2 months if mounjaro covered  August for a med check or sooner as needed

## (undated) DIAGNOSIS — R06.83 SNORING: Primary | ICD-10-CM

## (undated) NOTE — LETTER
Date: 5/7/2025    Patient Name: Jaylin Sanford          To Whom it may concern:    The above patient was seen at [facility] for treatment of a medical condition.    This patient should be excused from attending work from 05/7/2025.     The patient may return to work on 05/8/2025 with the No following limitations.         Sincerely,    JACKLYN Pandya

## (undated) NOTE — LETTER
2025        To Whom It May Concern,          The patient, Jaylin TEMPLETON 10/31/1980, is currently a patient under my care. She was evaluated     in our clinic for a medical condition. She is okay to return to work with no restrictions at this time. For any     questions please call our office at [phone].          Thank you,        Balbina GARCIA